# Patient Record
Sex: FEMALE | Race: WHITE | NOT HISPANIC OR LATINO | Employment: FULL TIME | URBAN - METROPOLITAN AREA
[De-identification: names, ages, dates, MRNs, and addresses within clinical notes are randomized per-mention and may not be internally consistent; named-entity substitution may affect disease eponyms.]

---

## 2017-01-13 ENCOUNTER — COMMUNICATION - HEALTHEAST (OUTPATIENT)
Dept: FAMILY MEDICINE | Facility: CLINIC | Age: 32
End: 2017-01-13

## 2017-03-09 ENCOUNTER — OFFICE VISIT - HEALTHEAST (OUTPATIENT)
Dept: FAMILY MEDICINE | Facility: CLINIC | Age: 32
End: 2017-03-09

## 2017-03-09 DIAGNOSIS — Z72.0 TOBACCO ABUSE: ICD-10-CM

## 2017-03-09 DIAGNOSIS — Z00.00 ROUTINE GENERAL MEDICAL EXAMINATION AT A HEALTH CARE FACILITY: ICD-10-CM

## 2017-03-09 DIAGNOSIS — Z11.3 SCREEN FOR STD (SEXUALLY TRANSMITTED DISEASE): ICD-10-CM

## 2017-03-09 DIAGNOSIS — R53.83 FATIGUE: ICD-10-CM

## 2017-03-09 LAB — HIV 1+2 AB+HIV1 P24 AG SERPL QL IA: NEGATIVE

## 2017-03-09 ASSESSMENT — MIFFLIN-ST. JEOR: SCORE: 1561.11

## 2017-03-10 LAB
HCV AB SERPL QL IA: NEGATIVE
SYPHILIS RPR SCREEN - HISTORICAL: NORMAL

## 2017-03-14 LAB
HPV INTERPRETATION - HISTORICAL: NORMAL
HPV INTERPRETER - HISTORICAL: NORMAL

## 2017-03-15 LAB
BKR LAB AP ABNORMAL BLEEDING: NO
BKR LAB AP BIRTH CONTROL/HORMONES: NORMAL
BKR LAB AP CERVICAL APPEARANCE: NORMAL
BKR LAB AP GYN ADEQUACY: NORMAL
BKR LAB AP GYN INTERPRETATION: NORMAL
BKR LAB AP HPV REFLEX: NORMAL
BKR LAB AP LMP: NORMAL
BKR LAB AP PATIENT STATUS: NO
BKR LAB AP PREVIOUS ABNORMAL: NO
BKR LAB AP PREVIOUS NORMAL: 2015
HIGH RISK?: NO
PATH REPORT.COMMENTS IMP SPEC: NORMAL
RESULT FLAG (HE HISTORICAL CONVERSION): NORMAL

## 2017-03-19 ENCOUNTER — COMMUNICATION - HEALTHEAST (OUTPATIENT)
Dept: FAMILY MEDICINE | Facility: CLINIC | Age: 32
End: 2017-03-19

## 2017-05-02 ENCOUNTER — COMMUNICATION - HEALTHEAST (OUTPATIENT)
Dept: FAMILY MEDICINE | Facility: CLINIC | Age: 32
End: 2017-05-02

## 2017-06-03 ENCOUNTER — HEALTH MAINTENANCE LETTER (OUTPATIENT)
Age: 32
End: 2017-06-03

## 2017-08-28 ENCOUNTER — OFFICE VISIT - HEALTHEAST (OUTPATIENT)
Dept: FAMILY MEDICINE | Facility: CLINIC | Age: 32
End: 2017-08-28

## 2017-08-28 DIAGNOSIS — J02.9 SORE THROAT: ICD-10-CM

## 2017-08-28 ASSESSMENT — MIFFLIN-ST. JEOR: SCORE: 1556.57

## 2017-08-30 ENCOUNTER — COMMUNICATION - HEALTHEAST (OUTPATIENT)
Dept: FAMILY MEDICINE | Facility: CLINIC | Age: 32
End: 2017-08-30

## 2017-08-30 DIAGNOSIS — J06.9 URI (UPPER RESPIRATORY INFECTION): ICD-10-CM

## 2018-02-06 ENCOUNTER — OFFICE VISIT - HEALTHEAST (OUTPATIENT)
Dept: FAMILY MEDICINE | Facility: CLINIC | Age: 33
End: 2018-02-06

## 2018-02-06 DIAGNOSIS — R05.9 COUGH: ICD-10-CM

## 2018-02-06 DIAGNOSIS — F41.9 ANXIETY: ICD-10-CM

## 2018-02-06 LAB
FLUAV AG SPEC QL IA: NORMAL
FLUBV AG SPEC QL IA: NORMAL

## 2018-02-06 ASSESSMENT — MIFFLIN-ST. JEOR: SCORE: 1507.53

## 2018-05-14 ENCOUNTER — COMMUNICATION - HEALTHEAST (OUTPATIENT)
Dept: FAMILY MEDICINE | Facility: CLINIC | Age: 33
End: 2018-05-14

## 2018-05-14 DIAGNOSIS — T63.441A BEE STING REACTION: ICD-10-CM

## 2018-09-17 ENCOUNTER — COMMUNICATION - HEALTHEAST (OUTPATIENT)
Dept: FAMILY MEDICINE | Facility: CLINIC | Age: 33
End: 2018-09-17

## 2018-09-18 ENCOUNTER — OFFICE VISIT - HEALTHEAST (OUTPATIENT)
Dept: FAMILY MEDICINE | Facility: CLINIC | Age: 33
End: 2018-09-18

## 2018-09-18 DIAGNOSIS — N91.2 AMENORRHEA: ICD-10-CM

## 2018-09-18 LAB
HCG UR QL: POSITIVE
SP GR UR STRIP: 1.02 (ref 1–1.03)

## 2018-09-18 ASSESSMENT — MIFFLIN-ST. JEOR: SCORE: 1552.03

## 2018-09-25 ENCOUNTER — COMMUNICATION - HEALTHEAST (OUTPATIENT)
Dept: FAMILY MEDICINE | Facility: CLINIC | Age: 33
End: 2018-09-25

## 2018-09-27 ENCOUNTER — COMMUNICATION - HEALTHEAST (OUTPATIENT)
Dept: FAMILY MEDICINE | Facility: CLINIC | Age: 33
End: 2018-09-27

## 2018-10-05 ENCOUNTER — COMMUNICATION - HEALTHEAST (OUTPATIENT)
Dept: FAMILY MEDICINE | Facility: CLINIC | Age: 33
End: 2018-10-05

## 2018-10-15 ENCOUNTER — PRENATAL OFFICE VISIT - HEALTHEAST (OUTPATIENT)
Dept: FAMILY MEDICINE | Facility: CLINIC | Age: 33
End: 2018-10-15

## 2018-10-15 DIAGNOSIS — F17.200 SMOKER: ICD-10-CM

## 2018-10-15 DIAGNOSIS — Z34.00 PREGNANCY, FIRST: ICD-10-CM

## 2018-10-15 LAB
ALBUMIN UR-MCNC: NEGATIVE MG/DL
AMPHETAMINES UR QL SCN: NORMAL
APPEARANCE UR: CLEAR
BARBITURATES UR QL: NORMAL
BASOPHILS # BLD AUTO: 0.1 THOU/UL (ref 0–0.2)
BASOPHILS NFR BLD AUTO: 1 % (ref 0–2)
BENZODIAZ UR QL: NORMAL
BILIRUB UR QL STRIP: NEGATIVE
CANNABINOIDS UR QL SCN: NORMAL
COCAINE UR QL: NORMAL
COLOR UR AUTO: YELLOW
CREAT UR-MCNC: 103 MG/DL
EOSINOPHIL # BLD AUTO: 0.1 THOU/UL (ref 0–0.4)
EOSINOPHIL NFR BLD AUTO: 1 % (ref 0–6)
ERYTHROCYTE [DISTWIDTH] IN BLOOD BY AUTOMATED COUNT: 12.7 % (ref 11–14.5)
GLUCOSE UR STRIP-MCNC: NEGATIVE MG/DL
HCT VFR BLD AUTO: 38.5 % (ref 35–47)
HGB BLD-MCNC: 12.8 G/DL (ref 12–16)
HGB UR QL STRIP: NEGATIVE
HIV 1+2 AB+HIV1 P24 AG SERPL QL IA: NEGATIVE
KETONES UR STRIP-MCNC: NEGATIVE MG/DL
LEUKOCYTE ESTERASE UR QL STRIP: ABNORMAL
LYMPHOCYTES # BLD AUTO: 1.6 THOU/UL (ref 0.8–4.4)
LYMPHOCYTES NFR BLD AUTO: 18 % (ref 20–40)
MCH RBC QN AUTO: 33 PG (ref 27–34)
MCHC RBC AUTO-ENTMCNC: 33.2 G/DL (ref 32–36)
MCV RBC AUTO: 99 FL (ref 80–100)
MONOCYTES # BLD AUTO: 0.7 THOU/UL (ref 0–0.9)
MONOCYTES NFR BLD AUTO: 8 % (ref 2–10)
NEUTROPHILS # BLD AUTO: 6.2 THOU/UL (ref 2–7.7)
NEUTROPHILS NFR BLD AUTO: 72 % (ref 50–70)
NITRATE UR QL: NEGATIVE
OPIATES UR QL SCN: NORMAL
OXYCODONE UR QL: NORMAL
PCP UR QL SCN: NORMAL
PH UR STRIP: 7.5 [PH] (ref 5–8)
PLATELET # BLD AUTO: 254 THOU/UL (ref 140–440)
PMV BLD AUTO: 10.6 FL (ref 8.5–12.5)
RBC # BLD AUTO: 3.88 MILL/UL (ref 3.8–5.4)
SP GR UR STRIP: 1.02 (ref 1–1.03)
UROBILINOGEN UR STRIP-ACNC: ABNORMAL
WBC: 8.6 THOU/UL (ref 4–11)

## 2018-10-16 LAB
ABO/RH(D): NORMAL
ABORH REPEAT: NORMAL
ANTIBODY SCREEN: NEGATIVE
BACTERIA SPEC CULT: NO GROWTH
C TRACH DNA SPEC QL PROBE+SIG AMP: NEGATIVE
HBV SURFACE AG SERPL QL IA: NEGATIVE
N GONORRHOEA DNA SPEC QL NAA+PROBE: NEGATIVE
RUBV IGG SERPL QL IA: POSITIVE
T PALLIDUM AB SER QL: NEGATIVE

## 2018-10-24 ENCOUNTER — RECORDS - HEALTHEAST (OUTPATIENT)
Dept: ADMINISTRATIVE | Facility: OTHER | Age: 33
End: 2018-10-24

## 2018-11-12 ENCOUNTER — COMMUNICATION - HEALTHEAST (OUTPATIENT)
Dept: FAMILY MEDICINE | Facility: CLINIC | Age: 33
End: 2018-11-12

## 2018-11-16 ENCOUNTER — COMMUNICATION - HEALTHEAST (OUTPATIENT)
Dept: FAMILY MEDICINE | Facility: CLINIC | Age: 33
End: 2018-11-16

## 2018-11-26 ENCOUNTER — PRENATAL OFFICE VISIT - HEALTHEAST (OUTPATIENT)
Dept: FAMILY MEDICINE | Facility: CLINIC | Age: 33
End: 2018-11-26

## 2018-11-26 DIAGNOSIS — Z34.02 PRIMIGRAVIDA IN SECOND TRIMESTER: ICD-10-CM

## 2018-12-08 ENCOUNTER — COMMUNICATION - HEALTHEAST (OUTPATIENT)
Dept: FAMILY MEDICINE | Facility: CLINIC | Age: 33
End: 2018-12-08

## 2018-12-17 ENCOUNTER — RECORDS - HEALTHEAST (OUTPATIENT)
Dept: ADMINISTRATIVE | Facility: OTHER | Age: 33
End: 2018-12-17

## 2018-12-17 ENCOUNTER — COMMUNICATION - HEALTHEAST (OUTPATIENT)
Dept: FAMILY MEDICINE | Facility: CLINIC | Age: 33
End: 2018-12-17

## 2019-01-28 ENCOUNTER — PRENATAL OFFICE VISIT - HEALTHEAST (OUTPATIENT)
Dept: FAMILY MEDICINE | Facility: CLINIC | Age: 34
End: 2019-01-28

## 2019-01-28 ENCOUNTER — COMMUNICATION - HEALTHEAST (OUTPATIENT)
Dept: FAMILY MEDICINE | Facility: CLINIC | Age: 34
End: 2019-01-28

## 2019-01-28 DIAGNOSIS — Z33.1 PREGNANCY, INCIDENTAL: ICD-10-CM

## 2019-01-28 LAB
ERYTHROCYTE [DISTWIDTH] IN BLOOD BY AUTOMATED COUNT: 10.1 % (ref 11–14.5)
FASTING STATUS PATIENT QL REPORTED: ABNORMAL
GLUCOSE 1H P 50 G GLC PO SERPL-MCNC: 144 MG/DL (ref 70–139)
HCT VFR BLD AUTO: 34.6 % (ref 35–47)
HGB BLD-MCNC: 11.7 G/DL (ref 12–16)
MCH RBC QN AUTO: 33.4 PG (ref 27–34)
MCHC RBC AUTO-ENTMCNC: 33.7 G/DL (ref 32–36)
MCV RBC AUTO: 99 FL (ref 80–100)
PLATELET # BLD AUTO: 263 THOU/UL (ref 140–440)
PMV BLD AUTO: 7.4 FL (ref 7–10)
RBC # BLD AUTO: 3.49 MILL/UL (ref 3.8–5.4)
WBC: 9.6 THOU/UL (ref 4–11)

## 2019-01-31 ENCOUNTER — COMMUNICATION - HEALTHEAST (OUTPATIENT)
Dept: FAMILY MEDICINE | Facility: CLINIC | Age: 34
End: 2019-01-31

## 2019-02-14 ENCOUNTER — PRENATAL OFFICE VISIT - HEALTHEAST (OUTPATIENT)
Dept: FAMILY MEDICINE | Facility: CLINIC | Age: 34
End: 2019-02-14

## 2019-02-14 DIAGNOSIS — Z33.1 PREGNANCY, INCIDENTAL: ICD-10-CM

## 2019-02-14 LAB
ERYTHROCYTE [DISTWIDTH] IN BLOOD BY AUTOMATED COUNT: 10.6 % (ref 11–14.5)
FASTING STATUS PATIENT QL REPORTED: YES
GLUCOSE 1H P 100 G GLC PO SERPL-MCNC: 142 MG/DL
GLUCOSE 2H P 100 G GLC PO SERPL-MCNC: 125 MG/DL
GLUCOSE 3H P 100 G GLC PO SERPL-MCNC: 65 MG/DL
GLUCOSE P FAST SERPL-MCNC: 91 MG/DL
HCT VFR BLD AUTO: 36 % (ref 35–47)
HGB BLD-MCNC: 12.2 G/DL (ref 12–16)
MCH RBC QN AUTO: 33.5 PG (ref 27–34)
MCHC RBC AUTO-ENTMCNC: 33.8 G/DL (ref 32–36)
MCV RBC AUTO: 99 FL (ref 80–100)
PLATELET # BLD AUTO: 239 THOU/UL (ref 140–440)
PMV BLD AUTO: 8.7 FL (ref 7–10)
RBC # BLD AUTO: 3.64 MILL/UL (ref 3.8–5.4)
WBC: 8.2 THOU/UL (ref 4–11)

## 2019-03-18 ENCOUNTER — COMMUNICATION - HEALTHEAST (OUTPATIENT)
Dept: FAMILY MEDICINE | Facility: CLINIC | Age: 34
End: 2019-03-18

## 2019-03-20 ENCOUNTER — PRENATAL OFFICE VISIT - HEALTHEAST (OUTPATIENT)
Dept: FAMILY MEDICINE | Facility: CLINIC | Age: 34
End: 2019-03-20

## 2019-03-20 DIAGNOSIS — Z3A.33 33 WEEKS GESTATION OF PREGNANCY: ICD-10-CM

## 2019-03-20 DIAGNOSIS — Z34.03 SUPERVISION OF NORMAL FIRST PREGNANCY IN THIRD TRIMESTER: ICD-10-CM

## 2019-04-03 ENCOUNTER — PRENATAL OFFICE VISIT - HEALTHEAST (OUTPATIENT)
Dept: FAMILY MEDICINE | Facility: CLINIC | Age: 34
End: 2019-04-03

## 2019-04-03 DIAGNOSIS — Z34.03 ENCOUNTER FOR SUPERVISION OF NORMAL FIRST PREGNANCY IN THIRD TRIMESTER: ICD-10-CM

## 2019-04-03 DIAGNOSIS — Z3A.35 35 WEEKS GESTATION OF PREGNANCY: ICD-10-CM

## 2019-04-03 LAB
ALBUMIN UR-MCNC: NEGATIVE MG/DL
GLUCOSE UR STRIP-MCNC: NEGATIVE MG/DL
KETONES UR STRIP-MCNC: NEGATIVE MG/DL

## 2019-04-04 ENCOUNTER — COMMUNICATION - HEALTHEAST (OUTPATIENT)
Dept: FAMILY MEDICINE | Facility: CLINIC | Age: 34
End: 2019-04-04

## 2019-04-04 LAB
ALLERGIC TO PENICILLIN: NO
GP B STREP DNA SPEC QL NAA+PROBE: NEGATIVE

## 2019-04-11 ENCOUNTER — PRENATAL OFFICE VISIT - HEALTHEAST (OUTPATIENT)
Dept: FAMILY MEDICINE | Facility: CLINIC | Age: 34
End: 2019-04-11

## 2019-04-11 DIAGNOSIS — Z34.90 PREGNANT: ICD-10-CM

## 2019-04-11 DIAGNOSIS — Z34.03 PRIMIGRAVIDA IN THIRD TRIMESTER: ICD-10-CM

## 2019-04-19 ENCOUNTER — PRENATAL OFFICE VISIT - HEALTHEAST (OUTPATIENT)
Dept: FAMILY MEDICINE | Facility: CLINIC | Age: 34
End: 2019-04-19

## 2019-04-19 DIAGNOSIS — Z34.93 SUPERVISION OF NORMAL PREGNANCY IN THIRD TRIMESTER: ICD-10-CM

## 2019-04-24 ENCOUNTER — COMMUNICATION - HEALTHEAST (OUTPATIENT)
Dept: FAMILY MEDICINE | Facility: CLINIC | Age: 34
End: 2019-04-24

## 2019-04-24 ENCOUNTER — ANESTHESIA - HEALTHEAST (OUTPATIENT)
Dept: OBGYN | Facility: CLINIC | Age: 34
End: 2019-04-24

## 2019-04-26 ENCOUNTER — HOME CARE/HOSPICE - HEALTHEAST (OUTPATIENT)
Dept: HOME HEALTH SERVICES | Facility: HOME HEALTH | Age: 34
End: 2019-04-26

## 2019-05-06 ENCOUNTER — AMBULATORY - HEALTHEAST (OUTPATIENT)
Dept: PEDIATRICS | Facility: CLINIC | Age: 34
End: 2019-05-06

## 2019-05-20 ENCOUNTER — COMMUNICATION - HEALTHEAST (OUTPATIENT)
Dept: FAMILY MEDICINE | Facility: CLINIC | Age: 34
End: 2019-05-20

## 2019-06-06 ENCOUNTER — OFFICE VISIT - HEALTHEAST (OUTPATIENT)
Dept: FAMILY MEDICINE | Facility: CLINIC | Age: 34
End: 2019-06-06

## 2019-06-06 ASSESSMENT — MIFFLIN-ST. JEOR: SCORE: 1650.69

## 2019-06-07 ENCOUNTER — COMMUNICATION - HEALTHEAST (OUTPATIENT)
Dept: FAMILY MEDICINE | Facility: CLINIC | Age: 34
End: 2019-06-07

## 2019-06-07 DIAGNOSIS — T63.441A BEE STING REACTION: ICD-10-CM

## 2019-06-09 ENCOUNTER — COMMUNICATION - HEALTHEAST (OUTPATIENT)
Dept: FAMILY MEDICINE | Facility: CLINIC | Age: 34
End: 2019-06-09

## 2019-06-09 DIAGNOSIS — T63.441A BEE STING REACTION: ICD-10-CM

## 2019-06-13 ENCOUNTER — COMMUNICATION - HEALTHEAST (OUTPATIENT)
Dept: FAMILY MEDICINE | Facility: CLINIC | Age: 34
End: 2019-06-13

## 2019-07-06 ENCOUNTER — COMMUNICATION - HEALTHEAST (OUTPATIENT)
Dept: FAMILY MEDICINE | Facility: CLINIC | Age: 34
End: 2019-07-06

## 2019-11-18 ENCOUNTER — COMMUNICATION - HEALTHEAST (OUTPATIENT)
Dept: FAMILY MEDICINE | Facility: CLINIC | Age: 34
End: 2019-11-18

## 2019-12-18 ENCOUNTER — COMMUNICATION - HEALTHEAST (OUTPATIENT)
Dept: FAMILY MEDICINE | Facility: CLINIC | Age: 34
End: 2019-12-18

## 2019-12-28 ENCOUNTER — COMMUNICATION - HEALTHEAST (OUTPATIENT)
Dept: FAMILY MEDICINE | Facility: CLINIC | Age: 34
End: 2019-12-28

## 2019-12-30 ENCOUNTER — OFFICE VISIT - HEALTHEAST (OUTPATIENT)
Dept: FAMILY MEDICINE | Facility: CLINIC | Age: 34
End: 2019-12-30

## 2019-12-30 DIAGNOSIS — R11.0 NAUSEA: ICD-10-CM

## 2019-12-30 DIAGNOSIS — L01.00 IMPETIGO: ICD-10-CM

## 2020-02-10 ENCOUNTER — COMMUNICATION - HEALTHEAST (OUTPATIENT)
Dept: FAMILY MEDICINE | Facility: CLINIC | Age: 35
End: 2020-02-10

## 2020-04-16 ENCOUNTER — COMMUNICATION - HEALTHEAST (OUTPATIENT)
Dept: FAMILY MEDICINE | Facility: CLINIC | Age: 35
End: 2020-04-16

## 2020-04-16 DIAGNOSIS — Z30.09 GENERAL COUNSELING FOR PRESCRIPTION OF ORAL CONTRACEPTIVES: ICD-10-CM

## 2020-06-06 ENCOUNTER — COMMUNICATION - HEALTHEAST (OUTPATIENT)
Dept: FAMILY MEDICINE | Facility: CLINIC | Age: 35
End: 2020-06-06

## 2020-07-26 ENCOUNTER — COMMUNICATION - HEALTHEAST (OUTPATIENT)
Dept: FAMILY MEDICINE | Facility: CLINIC | Age: 35
End: 2020-07-26

## 2020-07-26 DIAGNOSIS — L01.00 IMPETIGO: ICD-10-CM

## 2020-08-31 ENCOUNTER — AMBULATORY - HEALTHEAST (OUTPATIENT)
Dept: NURSING | Facility: CLINIC | Age: 35
End: 2020-08-31

## 2020-08-31 DIAGNOSIS — Z23 NEEDS FLU SHOT: ICD-10-CM

## 2020-11-04 ENCOUNTER — COMMUNICATION - HEALTHEAST (OUTPATIENT)
Dept: FAMILY MEDICINE | Facility: CLINIC | Age: 35
End: 2020-11-04

## 2020-12-17 ENCOUNTER — COMMUNICATION - HEALTHEAST (OUTPATIENT)
Dept: FAMILY MEDICINE | Facility: CLINIC | Age: 35
End: 2020-12-17

## 2021-02-03 ENCOUNTER — COMMUNICATION - HEALTHEAST (OUTPATIENT)
Dept: FAMILY MEDICINE | Facility: CLINIC | Age: 36
End: 2021-02-03

## 2021-05-26 ENCOUNTER — RECORDS - HEALTHEAST (OUTPATIENT)
Dept: ADMINISTRATIVE | Facility: CLINIC | Age: 36
End: 2021-05-26

## 2021-05-26 VITALS — SYSTOLIC BLOOD PRESSURE: 116 MMHG | DIASTOLIC BLOOD PRESSURE: 74 MMHG | HEART RATE: 88 BPM | TEMPERATURE: 97.9 F

## 2021-05-27 NOTE — PATIENT INSTRUCTIONS - HE
Patient Education   HEALTHY PREGNANCY CARE: 34-36 WEEKS PREGNANT    Your baby is gaining about an ounce each day, so healthy nutrition and rest are still very important. Learn about late pregnancy symptoms, such as constipation and backaches. Drinking more fluids and eating more fiber can relieve constipation. The pelvic tilt and a heating pad can ease backaches.    Continue to watch for signs and symptoms of preeclampsia:     Sudden swelling of your face, hands, or feet     New vision problems such as blurring, double vision, or flashing lights    A severe headache not relieved with acetaminophen (Tylenol)    Sharp or stabbing pain in your right or middle upper abdomen    You're almost done with your pregnancy but it's still too soon to have your baby. The goal is to have your baby after 37 weeks, so watch for signs and symptoms of premature labor:     Regular contractions. This means having about 6 or more within 1 hour, even after you have had a glass of water and are resting.     A backache that starts and stops regularly.    An increase or change in vaginal discharge, such as heavy, mucus-like, watery, or bloody discharge.     Your water breaks or leaks.    You will be tested for group B streptococcus (GBS), a type of bacteria found in 10-30% of pregnant women. A woman with GBS can pass it to her baby during delivery. Most babies who get GBS from their mothers do not have any problems, but some babies get very ill and need to be hospitalized for antibiotic treatment. Treating you with antibiotics during labor and delivery helps to prevent infection in your baby.    Review information on postpartum care that you will need after the baby is born.  Discuss your choices and plans for birth control with your midwife or physician.     Postpartum Care  During the days and weeks after the delivery of your baby (postpartum period), your body will change as it returns to its nonpregnant condition. As with pregnancy  "changes, postpartum changes are different for every woman.    Physical changes after childbirth  The changes in your body may include:    Contractions called afterpains shrink the uterus for several days after childbirth. Shrinking of the uterus to its prepregnancy size may take 6 to 8 weeks.    Sore muscles (especially in the arms, neck, or jaw) are common after childbirth. This is because of the hard work of labor and pushing your baby out. The soreness should go away in a few days.    Bleeding and vaginal discharge (lochia) may last for 2 to 8 weeks, and can come and go for about 2 months.    Vaginal soreness, including pain, discomfort, and numbness, is common after vaginal birth. Soreness may be worse if you had a perineal tear or episiotomy.    If you had a  birth (), you may have pain in your lower abdomen and may need pain medicine for 1 to 2 weeks.    Breast engorgement is common around the 3rd or 4th day after delivery, when the breasts begin to fill with milk. This can cause discomfort and swelling. If you are breast feeding, the best treatment is to feed your baby often or pump the milk out. You can also use warm compresses. If you are not breast feeding, placing ice packs on your breasts, taking a hot shower, or using warm compresses may relieve the discomfort.    Be aware of postpartum depression    \"Baby blues\" are common for the first 1 to 2 weeks after birth. You may cry or feel sad or irritable for no reason.     For some women, these feelings last longer and are more intense. This is called postpartum depression.     If your symptoms last for more than a few weeks or you feel very depressed, ask your midwife or physician for help.     Postpartum depression can be treated. Support groups and counseling can help, but sometimes medication is needed.     Discuss follow-up appointments with your midwife or physician, as well. He or she will usually want to see you 6 weeks after the " birth of your baby, sooner if you are having problems.    If you have questions about any symptoms you are experiencing or any other concerns, call your provider or their clinic staff at [facility] at Phone: [phone]. If it's after clinic hours, physician patients should call the [facility] at [phone] ([phone]); midwife patients should call their answering service at [phone].    How can you care for yourself at home?   You can refer to the Starting Out Right book or find it online at [email or web address]-[email or web address]-Right.pdf or [email or web address]/images/[email or web address]/flipbooks/[facility]-starting-out-right/[facility]-starting-out-right.html#p=8     You can sign up for a weekly parenting e-mail that gives support, tips and advice from health care professionals that starts with pregnancy and continues through the toddler years. To register, go to [email or web address] at any time during your pregnancy.        Making Plans for Feeding My Baby    By this point, you probably have read a lot about feeding your baby.  Breastfeed or formula? Each mother s decision is her own and [facility] respects you and your choices. We ve gathered information on both breastfeeding and formula feeding to help with your decision. Talking with your physician or nurse-midwife can also help in your decision.  However you plan to feed your baby, [facility] encourage rooming in with your baby, skin-to-skin contact and feeding your baby based on his or her cues.    Skin-to-skin contact  Being close to mom helps your baby adjust to life outside of the womb.  It helps your baby regulate their body temperature, heart rate, and breathing.  Your baby will usually be placed skin-to-skin immediately following birth or as soon as possible, if medical intervention is needed.    Rooming-In  Having your  baby stay with you in your room is called  rooming-in .  Keeping your baby in your room helps you to learn how to care for your baby by getting to know your baby s cues, body rhythms and sleep cycle.       Cue-based feeding  Cues (signals) are baby s way of telling you what he or she wants.  When you learn your infant s cues, you know how to care for and feed your baby.   Feeding cues are the licking and smacking of lips, bringing their fist to their mouth, and a reflex called  rooting - where baby turns and opens his or her mouth, searching for the breast or bottle.  Crying is a late feeding cue.  Babies can feed frequently, often at least 8 times in 24 hours.  Breastfeeding facts  Breast milk is the best source of nutrition for your baby and is available at birth. In the first couple of days, your milk volume is already starting to increase, though it may not be noticeable. Breastfeed frequently to increase your milk supply. Within three to five days, you will begin to notice larger milk volumes. An increase in breast size, heaviness and firmness are often described as the milk  coming in.  Frequent breastfeeding can help breasts from getting overly firm and painful. You will know the baby is getting enough milk if your baby is having wet and dirty diapers and gaining weight.     If your goal is to exclusively breastfeed, it is important to not use any formula or artificial nipples (including bottles and pacifiers) while your baby is learning to breastfeed.  While it may seem like an  easy  option to give your baby a bottle, formula should only be given if there is a medical reason for your baby to have it.    Positioning and attachment   Get comfortable.  Use pillows as needed to support your arms and baby.  Hold baby close at the level of your breast, facing you in a tummy to tummy position.  Skin to skin helps with this.  Position the baby with his or her nose by the nipple.  There should be a straight line  from baby s ear to shoulder to hips.  Tickle your baby s lips or wait for baby to open mouth wide, bring baby to breast by leading with the chin.  Aim the nipple at the roof of baby s mouth.  A rapid sucking pattern is followed by longer, drawing pattern with occasional swallows heard.  When baby is correctly latched, your nipple and much of the areola are pulled well into baby s mouth.      Returning to work or school  Focus on a good start to breastfeeding.  Many women continue to provide breastmilk for their baby when they return to work or school.  Making plans about where to pump and store milk can make the transition go well.  Talk with other mothers who have also returned to work or school for tips and support.  Your employer s Human Resource department may be a resource as well.     Returning to work or school: (continued)     babies can mean fewer  sick  days for you.    A quality breast pump will also save time and add comfort.  Check with your insurance prior to giving birth for breast pump coverage.  Many insurance companies include a pump within your benefits.    Wait until your baby is at least three weeks old to introduce a bottle for the first time.  Have someone besides you give the bottle.    Breastfeed when you are with your baby. Reserve your bottles of breast milk for when you are away.     Your breasts will need to be  emptied  either by your baby or a pump.  Plan to pump at least twice in an eight hour day.    If you cannot pump at work, continue breastfeeding at home. Any amount of breast milk is worth giving to your baby.    Formula feeding facts  If you are planning to use formula to feed your baby, you will want to make some preparations ahead of time. Talk to your doctor or nurse-midwife about what type of formula to use. Some are iron-fortified, meaning they have extra iron in them. You will want to purchase formula and bottles before your baby is born to be sure you are ready  after you return from the hospital. The [facility] do not provide formula samples to take home.    Be sure to follow formula mixing directions closely. Regular milk in the dairy case at the grocery store should not be given to babies under 1 year old. Baby formula is sold in several forms including:    Ready-to-use. This is the most expensive, but no mixing is necessary.    Concentrated liquid. This is less expensive than ready-to-use and you mix with water.    Powder. This is the least expensive. You mix one level scoop of powdered formula with two ounces of water and stir well.    Most babies need 2.5 ounces of formula per pound of body weight each day. This means an 8-pound baby may drink about 20 ounces of formula a day; however, this is just an estimate. The most important thing is to pay attention to your baby s cues.  If your baby is always fussy, needs more iron or has certain food allergies, your physician may suggest you change your baby s formula to a different kind.   How do I warm my baby s bottles?  You may feed your baby a bottle without warming it first. It is OK for the breast milk or formula to be cool or room temperature. If your baby seems to prefer it warmed, you can put the filled bottle in a container of warm water and let it stand for a few minutes. Check the temperature of the liquid on your skin before feeding it to your baby; to be sure it isn t too hot. Do not heat bottles in the microwave. Microwaves heat food and liquids unevenly, and this can cause hot spots that can burn your baby.    How do I clean and sterilize bottles?  Sterilize bottles and nipples before you use them for the first time. You can do this by putting them in boiling water for 5 minutes. After that first time, you can wash them in hot and soapy water. Rinse them carefully to be sure there is no soap left on them. You can also wash them in the .    [facility] Connection [phone] ([phone])

## 2021-05-27 NOTE — PROGRESS NOTES
"Patient states she is doing fairly well, hanging in there.  She is sleeping fairly well.  I have not seen her now since February.  She has seen 1 of my partners the last 2 appointments as she stated \"she can get in to see me\".  Discussed that I would always squeeze her in if she cannot get an appointment.  Recommend that she make some of her appointments at a time.  We reviewed her delivery preferences today.  She states the father the baby may or may not be there.  She otherwise plans to possibly just labor on her own.  She is now thinking that she probably wants to get an epidural at some point.  She had a difficult cervical exam today as she was having discomfort and it was very posterior.  The head is actually come down anteriorly and pushed cervix way back.  She is having good fetal movement.  Her GBS was negative and she did her tetanus booster.  She does plan to breast-feed and can get a breast pump at the hospital or from our clinic after delivery.  We will plan to see her weekly at this point.  I did discuss with her that the sooner she could be induced would be 41 weeks if there is no other complications.  "

## 2021-05-27 NOTE — PROGRESS NOTES
[facility] Clinic Note    Kaley Mills  1985   [id number]    Kaley Mills is a 34 y.o.  at 35w5d by LMP presenting to discuss the following:       CC:   Chief Complaint   Patient presents with     Routine Prenatal Visit       HPI:  Kaley is doing well today. No particular concerns today. She is feeling baby move regularly. Trace lower leg swelling. Starting to have some [place] contractions.    ROS:   No headaches, no vision changes, no chest pain, no shortness of breath, no RUQ abdominal pain, no vaginal discharge/bleeding.      PMH:   Patient Active Problem List   Diagnosis     Bee sting reaction     Primigravida in third trimester       No past medical history on file.    PSH:   No past surgical history on file.      MEDICATIONS:   Current Outpatient Medications on File Prior to Visit   Medication Sig Dispense Refill     EPINEPHrine (EPIPEN/ADRENACLICK) 0.3 mg/0.3 mL injection Inject 0.3 mL (0.3 mg total) into the shoulder, thigh, or buttocks once as needed. 2 Pre-filled Pen Syringe 2     prenatal vit 91/iron/folic/dha (PRENATAL + DHA ORAL) Take by mouth daily.       No current facility-administered medications on file prior to visit.        ALLERGIES:  Allergies   Allergen Reactions     Bee Venom Protein (Honey Bee)        PHYSICAL EXAM:   /70   Wt 219 lb 4 oz (99.5 kg)   LMP 2018   BMI 33.83 kg/m     GENERAL: Kaley is a pleasant, well appearing female.   ABDOMEN: FH 36cm,  pbm, vertex presentation by bedside US.   EXTREMITIES: Trace lower extremity edema.     ASSESSMENT & PLAN:   Kaley Mills is a 34 y.o.  at 35w5d presenting for routine OB care.    1. Encounter for supervision of normal first pregnancy in third trimester  2. 35 weeks gestation of pregnancy  - Group B Strep Screen by PCR  - Urinalysis, OB Screen (ketones, glucose, protein only)     Pregnancy is going well. GBS collected today.     RTC: 1 week - JUHI Maciel DO

## 2021-05-28 NOTE — PROGRESS NOTES
Patient is feeling fairly well, she is sleeping well.  She is not having any contractions, bleeding, or leaking.  She declines doing the pelvic exam today.  She is still vertex on exam.  Good fetal heart tones.  Follow-up in 1 week.

## 2021-05-28 NOTE — ANESTHESIA PROCEDURE NOTES
Epidural Block    Patient location during procedure: OB  Time Called: 4/24/2019 1:39 PM  Reason for Block:labor epidural  Staffing:  Performing  Anesthesiologist: Pietro Oliveira IV, MD  Preanesthetic Checklist  Completed: patient identified, risks, benefits, and alternatives discussed, timeout performed, consent obtained, at patient's request, airway assessed, oxygen available, suction available, emergency drugs available and hand hygiene performed  Procedure  Patient position: sitting  Prep: ChloraPrep  Patient monitoring: continuous pulse oximetry, heart rate and blood pressure  Approach: midline  Location: L3-L4  Injection technique: MÓNICA saline  Number of Attempts:1  Needle  Needle type: Eliezer   Needle gauge: 18 G     Catheter in Space: 4  Assessment  Sensory level:  No complications

## 2021-05-28 NOTE — ANESTHESIA POSTPROCEDURE EVALUATION
Patient: Kaley Mills  * No procedures listed *  Anesthesia type: epidural    Patient location: PACU  Last vitals: No vitals data found for the desired time range.    Post vital signs: stable  Level of consciousness: awake and responds to simple questions  Post-anesthesia pain: pain controlled  Post-anesthesia nausea and vomiting: no  Pulmonary: unassisted, return to baseline  Cardiovascular: stable and blood pressure at baseline  Hydration: adequate  Anesthetic events: no    QCDR Measures:  ASA# 11 - Whitney-op Cardiac Arrest: ASA11B - Patient did NOT experience unanticipated cardiac arrest  ASA# 12 - Whitney-op Mortality Rate: ASA12B - Patient did NOT die  ASA# 13 - PACU Re-Intubation Rate: ASA13B - Patient did NOT require a new airway mgmt  ASA# 10 - Composite Anes Safety: ASA10A - No serious adverse event    Additional Notes:

## 2021-05-28 NOTE — PROGRESS NOTES
"[facility] [place] [facility] Lactation Visit     Assessment:     1.  difficulty in feeding at breast         Gisell has been gaining weight well and is above her birth weight, but has been primarily bottle fed as mom has not been successful in getting her to latch onto the breast at home. Today we used a 20 mm nipple shield and baby was able to latch on well and transfer 1.3 oz from the breast in the office. Mom's milk supply is slightly lower than what Gisell needs at this time for good growth.      Plan:     Breastfeed on demand, at least 8-12 times a day. The more often you nurse or pump, the more milk you will make. Try to keep engaged at the breast for about 20 minutes per side. Offer both sides every time, and alternate which breast you start on. Latch baby deeply by making a \"breast sandwich,\" and aim your nipple for the roof of the mouth. Use the 20 mm nipple shield for now - when she is consistently latching well with this, we can try to get her back to nursing without the shield.      If baby's lips are rolled inward, flip the top lip out with your finger, and then apply gentle downward pressure to the chin to help the lips flange out like \"fish lips.\" If you have pain that lasts beyond the initial latch-on, always restart. When sucking/swallowing frequency starts to slow down, do breast compressions/massage and tickle baby's feet to keep them alert with feeding. A diaper change between sides can be helpful to keep her alert.     Supplementation plan: Follow her cues. If she still seems hungry after nursing then give her a supplement of expressed breast milk or formula. Start with about 1 oz, more if she demands.      Recommended to pump: At least 4 times per day after nursing for now to continue to increase your milk supply.     Continue to monitor output, expect at least 6 wet diapers per day.   Recommended Vitamin D 400 IU daily.         Provided mom with a tip sheet for exclusive pumping as well as " information about increasing breast milk supply.      Follow up in about 1 week for a weight check, for follow up lactation as needed.         SUBJECTIVE:      Gisell is here today with mom, Kaley, for lactation support. She is a 11 days female born at Gestational Age: 38w6d now 11 days.    She is having difficulties with feeding. She has gained 5.5 oz since last visit 4 days ago. She has gained approximately 1.4 oz per day over the past 4 days.     Baby attempts to nurse twice a day. She has not latched on successfully since she was in the hospital.    Baby bottle feeds about 8 times in 24 hours.   Baby is supplemented with expressed breast milk or formula, about 1 oz of breast milk and then 1-2 of formula.   Mom is also pumping about 8 times per day and gets about 2 oz per pumping session. She pumps for about 10 - 15 minutes per session  Number of wet diapers in 24 hours:   Number of stools in 24 hours: 2 +  Color and consistency of stools: yellow/brown and loose  Mom noticed her breasts grew larger and areolas darkened during pregnancy and she noticed primary engorgement when her milk came in on day 3-4.        Breastfeeding Goals: Hoping to latch baby to the breast      Previous Breastfeeding Experience: None  Breast-surgery: No  Maternal medications: tylenol, ibuprofen, PNV, fenugreek and blessed thistle packets     Hospital course:  Female-Kaley Mills is a currently 3 days old infant born at 38w6d gestation via Vaginal, Spontaneous delivery on 4/25/2019 at 12:06 AM in the setting of meconium, OP positioning with Caput hematoma formation.     Following delivery the infant remained with mother in the room until noted at 50 hours of life to have indirect hyperbilirubinemia s/t cephalohematoma formation and initially taking low volumes of breastmilk with 8.5% weight loss on Day 2 of life, improved to 6% weight loss on Day 3 of life, day of discharge.      Baby was transferred to NICU for phototherapy and received  ~30hrs of light therapy. Mom is O+, baby A+, MARIANA negative.     Bilirubin's as follows:  Serum BiliT 14.2 @ 49hr of life--> 11.2 @ 62hr of life--> 6.9 @ 78hr of life before discharge.                     Results for orders placed or performed during the hospital encounter of 19   [facility] Metabolic Screen   Result Value Ref Range     Scan Result See Scanned Report     Bilirubin,  Panel   Result Value Ref Range     Bilirubin, Total 14.2 (H) 0.0 - 7.0 mg/dL     Bilirubin, Direct 0.4 <=0.5 mg/dL     Bilirubin, Indirect 13.8 (H) 0.0 - 7.0 mg/dL     Age in Hours 49 hours   Bilirubin,  Panel   Result Value Ref Range     Bilirubin, Total 11.2 (H) 0.0 - 7.0 mg/dL     Bilirubin, Direct 0.4 <=0.5 mg/dL     Bilirubin, Indirect 10.8 (H) 0.0 - 7.0 mg/dL     Age in Hours 62 hours   ABO/Rh Typing ()   Result Value Ref Range     ABO/Rh Typing A POS       ABO/Rh Typing Repeat A POS     Direct Antiglobulin Test (patient = 4 months old)   Result Value Ref Range     MARIANA IgG NEG Negative   Bilirubin,  Total   Result Value Ref Range     Bilirubin, Total 6.9 0.0 - 7.0 mg/dL     Age in Hours 78 hours      No current outpatient medications on file.  No past medical history on file.  No past surgical history on file.        Family History   Problem Relation Age of Onset     Breast cancer Maternal Grandmother           diagnosed in 50's     Melanoma Maternal Grandfather 62            Primary care provider: Mera Keita MD     OBJECTIVE:     Mother:   Nipples are everted, the areola is compressible, the breast is soft and full.      Sore nipples: None   Maternal depression screening: Doing well  EPDS: 7     Infant:      Age today: 11 days         Vitals:     19 1500   Pulse: 162   Resp: 58   Temp: 98.5  F (36.9  C)            Weight:       Wt Readings from Last 3 Encounters:   19 7 lb 8.5 oz (3.416 kg) (37 %, Z= -0.33)*   19 7 lb 3 oz (3.26 kg) (34 %, Z= -0.40)*   19 6 lb 14.6 oz  "(3.134 kg) (34 %, Z= -0.42)*      * Growth percentiles are based on WHO (Girls, 0-2 years) data.         Birthweight:  7 lb 6 oz (3.345 kg).   Today's weight:    Vitals        Vitals:     05/06/19 1500 05/06/19 1514   Weight: 7 lb 7.5 oz (3.388 kg) 7 lb 8.5 oz (3.416 kg)      . This is 2% from birth weight.   Average weight gain over last 4 days: 1.4 oz/day.        Test weights:     LEFT side: 0.5 oz  RIGHT side: 0.8 oz     TOTAL transfer:  1.3 oz     Feeding assessment:      Digital suck assessment:  Infant draws consultant's finger into mouth, palate intact, tongue over gums, normal frenulum.      We initially tried to latch baby without a nipple shield. She quickly became frantic and was rooting but did not latch. After holding a \"breast sandwich\" baby did latch on and sucked for about 30 seconds. Mom said it was comfortable and felt similar to her breast pump. Gisell then un-latched and after several attempts did not latch back on. At that point a 20 mm nipple shield was applied to mom's nipple, and Gisell was quickly able to latch on and sustain a latch with audible swallows for about 10-15 minutes per side.      Baby can hold suction with tongue while at the breast.      Alignment: Baby's head was aligned with its trunk. Baby did face mother. Baby was in cross cradle position today.      Areolar Grasp: Baby was able to open mouth wide. Baby's lips were not pursed. Baby's lips did flange outward. Tongue was visible just barely over bottom lip. Baby had complete seal.      Areolar Compression: Baby made rhythmic motion. There were no clicking or smacking sounds. There was no severe nipple discomfort.  Nipples appeared round after feeding.     Audible swallowing: Baby made quiet sounds of swallowing: There was an increase in frequency after milk ejection reflex. The milk ejection reflex is appropriate and milk supply appears slightly low today.       The visit lasted a total of 60 minutes that I spent face to face with " the patient. Of that time, 60 minutes were spent on lactation. Over 50% of the time was spent counseling and educating the patient about feeding difficulties and lactation concerns.      Completed by:   SATYA Scott, IBCLC, [facility], Pediatrics.  5/6/2019 12:49 PM

## 2021-05-28 NOTE — ANESTHESIA PREPROCEDURE EVALUATION
Anesthesia Evaluation      Patient summary reviewed   No history of anesthetic complications     Airway   Mallampati: II  Neck ROM: full   Pulmonary - negative ROS and normal exam                          Cardiovascular - negative ROS and normal exam   Neuro/Psych - negative ROS     Endo/Other - negative ROS      GI/Hepatic/Renal - negative ROS           Dental                         Anesthesia Plan  Planned anesthetic: epidural    ASA 2     Anesthetic plan and risks discussed with: patient

## 2021-05-28 NOTE — TELEPHONE ENCOUNTER
Weeks 39    Contractions started in the middle of night    Contractions are now 2-3 minutes apart and getting stronger    Just had a gush of fluids    Baby number one    [facility] for delivery.    [facility] called.    Pt to go to L & D.    Sarah Rodriguez RN  [facility] Medication Refill and Triage Nurse  4/24/2019  7:58 AM      Reason for Disposition    First baby (primipara) with contractions < 6 minutes apart, and present 2 hours    Protocols used: PREGNANCY - LABOR-A-OH

## 2021-05-29 NOTE — PROGRESS NOTES
Assessment/ Plan     1. Postpartum exam  Patient is now 6 weeks postpartum from a vaginal delivery.  She is doing very verbally well.  She still having some pelvic pain and would like refills of the ibuprofen.  She declines birth control today.  She is up-to-date on Pap smear.  Will fax a return to work note to [employer].  - ibuprofen (ADVIL,MOTRIN) 800 MG tablet; Take 1 tablet (800 mg total) by mouth every 8 (eight) hours.  Dispense: 60 tablet; Refill: 0      Subjective:       Kaley Mills is a 34 y.o. female who presents for postpartum exam.  Patient is now 6 weeks postpartum from a normal spontaneous vaginal delivery.  She had a second-degree tear and states that that is feeling back to normal.  She is having normal bowel and bladder function.  She feels like she is bonding with the baby.  She is pumping and giving the baby a bottle with breast milk and formula.  She has low supply and has tried using fenugreek over-the-counter.  She is happy with how things are going now with the breast-feeding.  She plans on going back to work at 12 weeks.  Her mood is normal, no postpartum depression or anxiety.  The father of the baby has been somewhat involved.  She feels like she is getting good support from her mother.  She has stopped bleeding almost immediately and now she thinks she is actually had a period.  She does not want to do any birth control at this point.  She is up-to-date on Pap smear.  She still having some pelvic discomfort and would like a refill on the ibuprofen as its working well for her.    Relevant past medical, family, surgical, and social history reviewed with patient, unless noted in HPI, not pertinent for this visit.  Medications were discussed and reconciled.   Review of Systems   A 12 point comprehensive review of systems was negative except as noted.      Current Outpatient Medications   Medication Sig Dispense Refill     acetaminophen (TYLENOL) 500 MG tablet Take 2 tablets (1,000 mg total) by  "mouth every 6 (six) hours as needed.  0     EPINEPHrine (EPIPEN/ADRENACLICK) 0.3 mg/0.3 mL injection Inject 0.3 mL (0.3 mg total) into the shoulder, thigh, or buttocks once as needed. 2 Pre-filled Pen Syringe 2     ibuprofen (ADVIL,MOTRIN) 800 MG tablet Take 1 tablet (800 mg total) by mouth every 8 (eight) hours. 60 tablet 0     prenatal vit 91/iron/folic/dha (PRENATAL + DHA ORAL) Take by mouth daily.       No current facility-administered medications for this visit.        Objective:      /70   Pulse 62   Temp 98.2  F (36.8  C) (Oral)   Resp 16   Ht 5' 8\" (1.727 m)   Wt 200 lb (90.7 kg)   LMP 07/27/2018   BMI 30.41 kg/m        General appearance: alert, appears stated age and cooperative  Good eye contact and social smile speech normal fluency and content.  Lungs: clear to auscultation bilaterally  Heart: regular rate and rhythm, S1, S2 normal, no murmur, click, rub or gallop  Patient declines pelvic exam today.    No results found for this or any previous visit (from the past 168 hour(s)).       This note has been dictated using voice recognition software. Any grammatical or context distortions are unintentional and inherent to the software  "

## 2021-05-30 VITALS — HEIGHT: 68 IN | WEIGHT: 182 LBS | BODY MASS INDEX: 27.58 KG/M2

## 2021-05-31 VITALS — WEIGHT: 181 LBS | BODY MASS INDEX: 27.43 KG/M2 | HEIGHT: 68 IN

## 2021-05-31 VITALS — BODY MASS INDEX: 25.79 KG/M2 | WEIGHT: 170.19 LBS | HEIGHT: 68 IN

## 2021-06-02 VITALS — HEIGHT: 68 IN | BODY MASS INDEX: 27.28 KG/M2 | WEIGHT: 180 LBS

## 2021-06-02 VITALS — WEIGHT: 219.13 LBS | BODY MASS INDEX: 33.81 KG/M2

## 2021-06-02 VITALS — WEIGHT: 186 LBS | BODY MASS INDEX: 28.7 KG/M2

## 2021-06-02 VITALS — BODY MASS INDEX: 32.71 KG/M2 | WEIGHT: 212 LBS

## 2021-06-02 VITALS — BODY MASS INDEX: 32.41 KG/M2 | WEIGHT: 210 LBS

## 2021-06-02 VITALS — WEIGHT: 194 LBS | BODY MASS INDEX: 29.94 KG/M2

## 2021-06-02 VITALS — WEIGHT: 219.25 LBS | BODY MASS INDEX: 33.83 KG/M2

## 2021-06-02 VITALS — WEIGHT: 224 LBS | BODY MASS INDEX: 34.57 KG/M2

## 2021-06-02 VITALS — WEIGHT: 223 LBS | BODY MASS INDEX: 34.41 KG/M2

## 2021-06-03 VITALS — BODY MASS INDEX: 30.31 KG/M2 | WEIGHT: 200 LBS | HEIGHT: 68 IN

## 2021-06-03 NOTE — TELEPHONE ENCOUNTER
Duplicate message.   [employer] message has already been sent through pt's chart, it has been sent to the provider.

## 2021-06-04 NOTE — PROGRESS NOTES
"Assessment/Plan:    Klaey Mills is a 34 y.o. female presenting for:    1. Impetigo  Mupirocin sent to the pharmacy.  She has used this well in the past.  I did send an order of Keflex to the pharmacy as well but I encouraged her to use mupirocin for a few days prior to initiating the Keflex.  She is in agreement with this plan.  I did encourage her to use mupirocin intranasally twice daily for 10 to 14 days due to her concern for MRSA.  - cephalexin (KEFLEX) 500 MG capsule; Take 1 capsule (500 mg total) by mouth 3 (three) times a day for 7 days.  Dispense: 21 capsule; Refill: 0  - mupirocin (BACTROBAN) 2 % ointment; Apply to affected area TID for ten days.  Dispense: 22 g; Refill: 2    2. Nausea  Zofran sent to the pharmacy.  Note for work was given.  Likely viral gastroenteritis.  - ondansetron (ZOFRAN-ODT) 4 MG disintegrating tablet; Take 1 tablet (4 mg total) by mouth every 8 (eight) hours as needed for nausea.  Dispense: 12 tablet; Refill: 0        Medications Discontinued During This Encounter   Medication Reason     mupirocin (BACTROBAN) 2 % ointment Reorder     prenatal vit 91/iron/folic/dha (PRENATAL + DHA ORAL)            Chief Complaint:  Chief Complaint   Patient presents with     Vomiting     Vomiting & diarrhea yesterday.  She also had muscle aches and abdominal cramping     Sore     Sore on nose, pt states she has had cellulitis in the past and it feels the same     Note     Needs a doctors note        Subjective:   Kaley Mills is a 34-year-old female presenting today for several concerns:    1.  Vomiting and diarrhea: This is afflicted several members of her family.  Yesterday she states that she could not keep anything down.  Today she is keeping fluids down.  She was feeling fairly weak yesterday but feeling slightly better today.  She is somewhat adverse to using Zofran but does want something \"to help me.\"  She states that Zofran has not really helped in the past although she cannot really " think of a specific time when she had used it.    2.  Cellulitis: Patient notes that she will occasionally get a sore on the right side of her nose.  This will often turn into impetigo.  She is used mupirocin in the past for this however is not using it currently as this had run out.  She has not had this sore for the last week.  She thinks it is getting worse.  She is hopeful to get some mupirocin and possibly a oral antibiotic.  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  She states that whenever she works at a certain spot she will begin getting the sore.  She does not think it is related to stress.    12 point review of systems completed and negative except for what has been described above    Social History     Tobacco Use   Smoking Status Former Smoker     Types: Cigarettes   Smokeless Tobacco Never Used       Current Outpatient Medications   Medication Sig     acetaminophen (TYLENOL) 500 MG tablet Take 2 tablets (1,000 mg total) by mouth every 6 (six) hours as needed.     ibuprofen (ADVIL,MOTRIN) 800 MG tablet Take 1 tablet (800 mg total) by mouth every 8 (eight) hours.     cephalexin (KEFLEX) 500 MG capsule Take 1 capsule (500 mg total) by mouth 3 (three) times a day for 7 days.     EPINEPHrine (EPIPEN/ADRENACLICK/AUVI-Q) 0.3 mg/0.3 mL injection Inject 0.3 mL (0.3 mg total) into the shoulder, thigh, or buttocks once as needed.     mupirocin (BACTROBAN) 2 % ointment Apply to affected area TID for ten days.     ondansetron (ZOFRAN-ODT) 4 MG disintegrating tablet Take 1 tablet (4 mg total) by mouth every 8 (eight) hours as needed for nausea.         Objective:  Vitals:    12/30/19 0940   BP: 116/74   Pulse: 88   Temp: 97.9  F (36.6  C)   TempSrc: Axillary       There is no height or weight on file to calculate BMI.    Vital signs reviewed and stable  General: No acute distress  Psych: Appropriate affect  HEENT: moist mucous membranes  Cardiovascular: regular rate and rhythm with no  murmur  Pulmonary: clear to auscultation bilaterally with no wheeze  Abdomen: soft, non tender, non distended with normo-active bowel sounds  Extremities: warm and well perfused with no edema  Skin: warm and dry with a somewhat crusted rash on the right side of the end of her nose with some slight honey colored crusting and drainage         This note has been dictated and transcribed using voice recognition software.   Any errors in transcription are unintentional and inherent to the software.

## 2021-06-09 NOTE — PROGRESS NOTES
Assessment/ Plan     1. Routine general medical examination at a health care facility  As far as healthcare maintenance, she is requesting a Pap smear today.  She recently got out of the relationship and just wants to make sure everything is normal.  - Gynecologic Cytology (PAP Smear)    2. Fatigue  Patient complaining of fatigue and weight gain, likely related to stress.  Discussed some strategies for getting back into an exercise program and tracking calories and carbohydrates.  Discussed she is not a candidate for weight loss medication.  I also do not think she meets criteria for major depression and would not recommend starting medication.  Recommend that she start therapy to help with coping strategies.  - Basic Metabolic Panel  - HM2(CBC w/o Differential)  - Thyroid Stimulating Hormone (TSH)  - Vitamin D, Total (25-Hydroxy)    3. Screen for STD (sexually transmitted disease)  Patient would like screening for STD as she just got out of a relationship.  - Syphilis Screen, Cascade  - HIV Antigen/Antibody Screening Cascade  - Hepatitis C Antibody (Anti-HCV)  - Chlamydia trachomatis & Neisseria gonorrhoeae, Amplified Detection    4. Tobacco abuse  Patient smokes approximately 5 cigarettes per week as stress relief.  Recommend she try to find a better stress outlet.  She was wondering about Wellbutrin, I just do not think she would needed to quit smoking, certainly would not need patches at this point.  Recommend that she try quitting smoking on her own as she is such an infrequent smoker.  If she is really struggling, could call me back and would consider prescription for Wellbutrin.    Subjective:     Kaley Mills is a 32 y.o. female who presents for an annual exam.  She is new to the [facility] today.  She comes in today for a physical.  Her main concern is some weight gain over the last couple of years.  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  It is hard  "for her to find the time and energy to work out.  She feels like \"I am just not as happy as I think that should be\".  She is also smoking about 5 cigarettes per day.  She is wanting something to help her quit smoking.  She has not really tried anything in the past.  I discussed with her she smokes such a low amount, would not need patches for withdrawal symptoms.  Also would not suggest Wellbutrin.  She is wondering about a pill she can take to help her \"boost her tablets\".  I also discussed with her she would not meet criteria for weight loss medication.  She does need to figure out a way to get back into her exercise routine.  She thinks that maybe she is depressed, we discussed this further just sounds like she is not coping with stress very well.  She scored a 7 on her PHQ 9, only a 1 on anhedonia question or the feeling down question.  We discussed at length that metabolism can slow as you get older.  Recommend that she track her calories using an iPhone stephanie and also watching carbohydrates.  Would have her brainstorm to see if she can figure out a way to get her back into her regular workouts.  I recommend seeing a  or a  through [facility] wellness.  That would be more appropriate for her than this to water weight loss program.  Family history significant for mother with breast cancer in her 50s.    Healthy Habits:   Regular Exercise: Yes  Sunscreen Use: Yes  Healthy Diet: Yes  Dental Visits Regularly: Yes  Seat Belt: Yes  Sexually active: No  Self Breast Exam Monthly:No  Colonoscopy: No  Lipid Profile: No  Glucose Screen: No  Prevention of Osteoporosis: No  Last Dexa: No        Immunization History   Administered Date(s) Administered     DTaP, historic 1986, 1986, 1986, 12/09/1987, 01/08/1991     HPV Quadrivalent 02/13/2008, 05/02/2008, 08/18/2008     Hep B, historic 08/28/1998, 10/29/1998, 10/21/2002     Hib (PRP-T) 09/16/1988     IPV 1986, 1986, 12/09/1987, " "01/08/1991     MMR 04/14/1987, 08/28/1998     Tdap 12/05/2011     Immunization status: up to date and documented.     Gynecologic History  No LMP recorded.  Contraception: none  Last Pap: 2015. Results were: normal  Last mammogram: age 31. Results were: normal      OB History   No data available       No current outpatient prescriptions on file.     No current facility-administered medications for this visit.      No past medical history on file.  No past surgical history on file.  Bee pollen  No family history on file.  Social History     Social History     Marital status: Single     Spouse name: N/A     Number of children: N/A     Years of education: N/A     Occupational History     Not on file.     Social History Main Topics     Smoking status: Current Every Day Smoker     Types: Cigarettes     Smokeless tobacco: Not on file     Alcohol use Not on file     Drug use: Not on file     Sexual activity: Not on file     Other Topics Concern     Not on file     Social History Narrative       Review of Systems  General:  Denies problems  Eyes:  Denies problems  Ears/Nose/Throat:  Denies problems  Cardiovascular:  Denies problems  Respiratory:  Denies problems  Gastrointestinal:  Denies problems  Genitourinary:  Denies problems  Musculoskeletal:  Denies problems  Skin:  Denies problems  Neurologic:  Denies problems  Psychiatric:  Denies problems  Endocrine:  Denies problems  Heme/Lymphatic:  Denies problems  Allergic/Immunologic:  Denies problems    Objective:      Vitals:    03/09/17 1315   BP: 112/68   Pulse: 76   Resp: 12   Temp: 98.2  F (36.8  C)   TempSrc: Oral   Weight: 182 lb (82.6 kg)   Height: 5' 7.5\" (1.715 m)       Physical Exam:  General Appearance: Alert, cooperative, no distress, appears stated age  Head: Normocephalic, without obvious abnormality, atraumatic  Eyes: PERRL, conjunctiva/corneas clear, EOM's intact  Ears: Normal TM's and external ear canals, both ears  Nose: Nares normal, septum midline,mucosa " normal, no drainage  Throat: Lips, mucosa, and tongue normal; teeth and gums normal  Neck: Supple, symmetrical, trachea midline, no adenopathy; thyroid: not enlarged, symmetric, no tenderness/mass/nodules; no carotid bruit  Back: Symmetric, no curvature, ROM normal, no CVA tenderness  Lungs: Clear to auscultation bilaterally, respirations unlabored  Breasts: No breast masses, tenderness, asymmetry, or nipple discharge  Heart: Regular rate and rhythm, S1 and S2 normal, no murmur, rub, or gallop, Abdomen: Soft, non-tender, bowel sounds active all four quadrants,  no masses, no organomegaly  Pelvic: Normally developed genitalia with no external lesions or eruptions. Vagina and cervix show no lesions, inflammation, discharge or tenderness. Uterus normal to palpation.  No adnexal mass or tenderness.  Extremities: Extremities normal, atraumatic, no cyanosis or edema  Skin: Skin color, texture, turgor normal, no rashes or lesions  Lymph nodes: Cervical, supraclavicular, and axillary nodes normal  Neurologic: Normal      The following high BMI interventions were performed this visit: encouragement to exercise and dietary needs education    Results for orders placed or performed in visit on 04/06/15   GYNECOLOGIC CYTOLOGY (PAP SMEAR)   Result Value Ref Range    Case Report       Gynecologic Cytology Report                       Case: [id number]                                   --------------------------------------------------------------------------------------------------  Authorizing Provider:  Hamida Lee,    Ordering Provider:   Hamida Lee CNM CNM                                                                          First Screen:          PAULETTE Weldon   Collected:           4/6/2015 1601                                       (ASCP)                                                                                                                          Received:            4/7/2015 1005                                                                                                                    Specimen:    SUREPATH PAP, SCREENING, Endocervical/cervical                                             Interpretation       Negative for squamous intraepithelial lesion or malignancy    Result Flag Normal Normal    Other Findings       Shift in vaginal kassidy suggestive of bacterial vaginosis    Specimen Adequacy       Satisfactory for evaluation, endocervical/transformation zone component present    HPV Reflex? Yes if ASCUS     HIGH RISK No     LMP/Menopause Date 3-     Abnormal Bleeding No     Pt Status na     Birth Control/Hormones Pill/patch/ring     Previous Normal/Date 2012     Prev Abn Date/Dx na     Cervical Appearance neg        Mera Keita MD    This note has been dictated using voice recognition software. Any grammatical or context distortions are unintentional and inherent to the software

## 2021-06-12 NOTE — PROGRESS NOTES
Assessment/ Plan     1. Sore throat  Patient's rapid strep is negative.  Backup culture is sent and she will be notified only if positive. Discussed with the patient that their symptoms are consistent with a viral process.  No antibiotics are warranted at this time.  Discussed symptomatic cares they can try at home.  Recommended rest, push fluids, and ibuprofen or tylenol if fevers.  Advised to follow up in clinic if no improvement is symptoms over the next 1-2 weeks.  Would want to see them back sooner if have persistent fevers or worsening symptoms.    - Rapid Strep A Screen-Throat  - Group A Strep, RNA Direct Detection, Throat      Subjective:       Kaley Mills is a 32 y.o. female who presents for 3 day history of URI type symptoms.  She has had a sore throat, swollen glands, cough, and some tightness in her chest.  The cough has been nonproductive.  She has had no fevers.  No sick exposures that she knows of.  Her neck has been a little stiff but she can move it all around.  She has no nausea, vomiting, or diarrhea.  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  She has had some facial pain and pressure and some tooth pain.  She states that she sometimes gets sinus infections.  Discussed with her that if the strep is negative, this is a viral process.  Normally bacterial sinusitis does not begin until day 10-14 of an illness.  Discussed some things with her she can try over-the-counter such as Flonase, Mucinex, or Delsym.    Relevant past medical, family, surgical, and social history reviewed with patient, unless noted in HPI, not pertinent for this visit.    Review of Systems   A 12 point comprehensive review of systems was negative except as noted.      Current Outpatient Prescriptions   Medication Sig Dispense Refill     EPINEPHrine (EPIPEN) 0.3 mg/0.3 mL atIn Inject 0.3 mL (0.3 mg total) into the shoulder, thigh, or buttocks once as needed. 2  "Pre-filled Pen Syringe 2     No current facility-administered medications for this visit.        Objective:      /60 (Patient Site: Right Arm, Patient Position: Sitting, Cuff Size: Adult Regular)  Pulse 80  Temp 99  F (37.2  C) (Oral)   Resp 12  Ht 5' 7.5\" (1.715 m)  Wt 181 lb (82.1 kg)  LMP 08/14/2017 (Approximate)  BMI 27.93 kg/m2      General appearance: alert, appears stated age and cooperative  Head: Normocephalic, without obvious abnormality, atraumatic  Eyes: conjunctivae/corneas clear.   Ears: normal TM's and external ear canals both ears  Nose: Nares normal. Septum midline. Mucosa normal. No drainage or sinus tenderness.  Throat: lips, mucosa, and tongue normal; teeth and gums normal  Neck: no adenopathy, full range of motion without difficulty  Lungs: clear to auscultation bilaterally  Heart: regular rate and rhythm, S1, S2 normal, no murmur, click, rub or gallop      Recent Results (from the past 168 hour(s))   Rapid Strep A Screen-Throat   Result Value Ref Range    Rapid Strep A Antigen No Group A Strep detected, presumptive negative No Group A Strep detected, presumptive negative          This note has been dictated using voice recognition software. Any grammatical or context distortions are unintentional and inherent to the software  "

## 2021-06-15 NOTE — PROGRESS NOTES
"Assessment/Plan:    Kaley Mills is a 33 y.o. female presenting for:    1. Cough  Influenza test is negative today.  Chest x-ray is normal per my read as well as the radiologist final read.  The patient's is somewhat unsatisfied with these answers and would like me to do something to \"help\" her.  I did offer some cough medicine with codeine but she states that she got some NyQuil and will try that.  Note for work was given to excuse to excuse her for the next few days.  Encourage rest.  Encouraged her to drink non-caffeinated fluids.  - Influenza A/B Rapid Test  - XR Chest 2 Views    2. Anxiety  The patient's anxiety does seem to be work-related.  I discussed with the patient that certainly an antidepressant may help her cope slightly better with her anxiety but she is not really having panic attacks at this point I do not think that she really needs to take a medication.  She would likely benefit from seeing a therapist and I recommended that her today but she states that she does not think that they would do anything to help her.  That being said, she continues to ask me how she should deal with different situations at work and I do think that seeing a therapist would likely be helpful.  I did discuss with the patient that if there are certain people at work that she feels are treating her unfairly that she should talk with her human resources department about it.    We discussed the risks and benefits of the Zoloft with the risks mostly being headache, nausea and drowsiness.  She would like to know all of the risks today and I stated that she could certainly go home and read about them on the packet that comes with the medication.  - sertraline (ZOLOFT) 50 MG tablet; 1/2 daily for 2 weeks and then increase to a full tablet daily  Dispense: 30 tablet; Refill: 2        There are no discontinued medications.        Chief Complaint:  Chief Complaint   Patient presents with     Cough     Sxs started on Friday " "morning- unsure of fever     Chills     Hot and cold sweats     Generalized Body Aches     Nasal Congestion     Headache     Fatigue     Stress     Work related stress- dragging her down- wearing on her       Subjective:   Kaley Mills is a 33-year-old female presenting to the clinic today for several concerns:    1.  Generally feeling unwell: Patient notes that over the weekend she began to feel somewhat unwell with a runny nose and some slight dizziness.  She notes that she was painting inside on Sunday and became very sweaty.  She denies any chest pain or shortness of breath.  She has had a slight sore throat as well as a mild cough as well.  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  She does not think that she has had fevers but has had some hot and cold sweats.  Generalized body aches and nasal congestion.  She also notes a fairly generalized headache as well as some fatigue.  She states that when she takes a deep breath her lungs \"hurt all over.\"    2.  Stress and anxiety: Patient states that she is a CNA at [employer] hospital.  She finds her job to be very stressful.  She feels as though people do not treat her fairly sometimes it makes her \"job miserable.\"  She feels safe at work but states that she feels like she is pulled in many different directions.  She is not having any panic attacks.  She just wishes that \"work was not so difficult.\"  She states that every time she sits down some and will ask her to do something which she finds to be difficult.  She thinks that she is getting sick all the time because she is stressed out from work and wonders what I am going to do to help her about it today.  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  She feels as though she would like to be on a medication to help.    12 point review of systems completed and negative except for what has been described " "above    History   Smoking Status     Former Smoker     Types: Cigarettes   Smokeless Tobacco     Never Used       Current Outpatient Prescriptions   Medication Sig     EPINEPHrine (EPIPEN) 0.3 mg/0.3 mL atIn Inject 0.3 mL (0.3 mg total) into the shoulder, thigh, or buttocks once as needed.     sertraline (ZOLOFT) 50 MG tablet 1/2 daily for 2 weeks and then increase to a full tablet daily         Objective:  Vitals:    02/06/18 1012   BP: 100/78   Pulse: 88   Resp: 20   Temp: 98.6  F (37  C)   TempSrc: Oral   SpO2: 100%   Weight: 170 lb 3 oz (77.2 kg)   Height: 5' 7.5\" (1.715 m)       Vital signs reviewed and stable  General: No acute distress  Psych: Appropriate affect  HEENT: moist mucous membranes, pupils equal, round, reactive to light and accomodation, posterior oropharynx clear of erythema or exudate, tympanic membranes are pearly grey bilaterally  Lymph: no cervical or supraclavicular lymphadenopathy  Cardiovascular: regular rate and rhythm with no murmur  Pulmonary: clear to auscultation bilaterally with no wheeze  Abdomen: soft, non tender, non distended with normo-active bowel sounds  Extremities: warm and well perfused with no edema  Skin: warm and dry with no rash         This note has been dictated and transcribed using voice recognition software.   Any errors in transcription are unintentional and inherent to the software.  "

## 2021-06-20 NOTE — PROGRESS NOTES
Assessment/ Plan     1. Amenorrhea/pregnancy confirmation  Patient's urine pregnancy test was positive in the clinic today.  Her LMP started on July 26 giving her an EDC of 5/2/19.  This puts her at approximately 7 weeks 5 days.  Reviewed the entire OB packet with patient.  She still not sure where she wants to do her prenatal care and delivery.  If she plans on following up here, we will see her back in 4 weeks for her first OB visit.  She is up-to-date on Pap smear.  - Pregnancy (Beta-hCG, Qual), Urine      Subjective:       Kaley Mills is a 33 y.o. female who presents for pregnancy confirmation.  Patient states that her last menstrual period started around July 26.  She tends to have a regular menses.  She states that she was in a casual relationship with someone and knows the exact date of conception.  This would be August 10.  This person does not want to be involved in the pregnancy.  Patient states that she is thought about things and wants to keep the pregnancy.  She has a lot of questions today.  She is having a lot of common symptoms of early pregnancy including fatigue and nausea.  I reassured her that these are normal.  Also discussed increased smells can be normal.  I reviewed the entire OB packet with her.  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  I discussed with her that she can certainly do some research.  She initially asked about a home birth and I told her I do not do those and I do not recommend them.  She can certainly feel free to do some research on the subject if she would like to.  She is otherwise healthy without major medical problems.  She is not taking a prenatal.  I recommend that she start a prenatal and if that is making her nausea worse, make sure she takes a folic acid supplement.  She is also having some back pain and is wondering what she can take for pain.  I discussed with her using gentle  "heat and Tylenol.  Reviewed the medications that are safe in pregnancy with her.  Reviewed the normal course of prenatal visits.  Would plan to see her back in 1 month.  I discussed first trimester screening and she will think about things.    Relevant past medical, family, surgical, and social history reviewed with patient, unless noted in HPI, not pertinent for this visit.    Review of Systems   A 12 point comprehensive review of systems was negative except as noted.      Current Outpatient Prescriptions   Medication Sig Dispense Refill     EPINEPHrine (EPIPEN/ADRENACLICK) 0.3 mg/0.3 mL injection Inject 0.3 mL (0.3 mg total) into the shoulder, thigh, or buttocks once as needed. 2 Pre-filled Pen Syringe 2     No current facility-administered medications for this visit.        Objective:      /68  Pulse 80  Temp 98.3  F (36.8  C) (Oral)   Resp 16  Ht 5' 7.5\" (1.715 m)  Wt 180 lb (81.6 kg)  LMP 07/27/2018  BMI 27.78 kg/m2      General appearance: alert, appears stated age and cooperative         Recent Results (from the past 168 hour(s))   Pregnancy (Beta-hCG, Qual), Urine   Result Value Ref Range    Pregnancy Test, Urine Positive (!) Negative    Specific Gravity, UA 1.025 1.001 - 1.030          This note has been dictated using voice recognition software. Any grammatical or context distortions are unintentional and inherent to the software  "

## 2021-06-21 NOTE — PROGRESS NOTES
Doing well.  Her nausea has improved.  Had normal nuchal trans [facility].  Declines Quad screen today.  Order given for breast pump and 20 week fetal survey.  Plan to follow up in 4 weeks.

## 2021-06-21 NOTE — PROGRESS NOTES
PRENATAL VISIT   FIRST OBSTETRICAL EXAM - OB    Assessment / Impression       Normal first prenatal visit at 11w3d  Tobacco use  Discussed orientation, general information, lifestyle, nutrition, exercise,warning signs, resources, lab testing, risk screening and discussed cystic fibrosis screening with patient.  Questions answered.    Plan:     Recommend quitting all smoking   Initial labs drawn.  Prenatal vitamins.  Problem list reviewed and updated.  Genetic screening test options discussed:  Patient elects First trimester screen with nuchial translucency ultrasound  Role of ultrasound in pregnancy discussed; fetal survey: requested.  Follow up: 4 weeks      Subjective:    Kaley Mills is a 33 y.o.  here today for her First Obstetrical Exam.  She states that she is feeling better from a nausea standpoint.  We once again discussed first trimester screening and she would like to do the nuchal translucency test.  She does not think her insurance would cover the Gema Touch DNA test.  She is taking a prenatal vitamin.  She states that she is cut back on her smoking and we discussed trying to avoid all cigarette smoke.  We reviewed her past medical history and genetic history.  The father of the baby is not involved and is currently not talking to her so she cannot get any medical information.  She had her flu shot already at work.  We discussed routine prenatal visit it and how often will be seeing her.  We could not hear fetal heart tones today, but could see the fetus on handheld ultrasound and saw cardiac activity.        OB History    Para Term  AB Living   1        SAB TAB Ectopic Multiple Live Births             # Outcome Date GA Lbr Cm/2nd Weight Sex Delivery Anes PTL Lv   1 Current                   Expected Date of Delivery: 5/3/2019, by Last Menstrual Period    No past medical history on file.  No past surgical history on file.  Social History   Substance Use Topics     Smoking status:  Former Smoker     Types: Cigarettes     Smokeless tobacco: Never Used     Alcohol use None     Current Outpatient Prescriptions   Medication Sig Dispense Refill     EPINEPHrine (EPIPEN/ADRENACLICK) 0.3 mg/0.3 mL injection Inject 0.3 mL (0.3 mg total) into the shoulder, thigh, or buttocks once as needed. 2 Pre-filled Pen Syringe 2     prenatal vit 91/iron/folic/dha (PRENATAL + DHA ORAL) Take by mouth daily.       No current facility-administered medications for this visit.      Allergies   Allergen Reactions     Bee Venom Protein (Honey Bee)              High Risk Behavior: Currently unmarried    Review of Systems  General:  Denies problem  Eyes: Denies problem  Ears/Nose/Throat: Denies problem  Cardiovascular: Denies problem  Respiratory:  Denies problem  Gastrointestinal:  Denies problem, Genitourinary: Denies problem  Musculoskeletal:  Denies problem  Skin: Denies problem  Neurologic: Denies problem  Psychiatric: Denies problem  Endocrine: Denies problem  Heme/Lymphatic: Denies problem   Allergic/Immunologic: Denies problem       Objective:   Objective    Vitals:    10/15/18 1007   BP: 102/60   Weight: 186 lb (84.4 kg)     Physical Exam:  General Appearance: Alert, cooperative, no distress, appears stated age  Head: Normocephalic, without obvious abnormality, atraumatic  Eyes: PERRL, conjunctiva/corneas clear, EOM's intact  Ears: Normal TM's and external ear canals, both ears  Nose: Nares normal, septum midline,mucosa normal, no drainage  Throat: Lips, mucosa, and tongue normal; teeth and gums normal  Neck: Supple, symmetrical, trachea midline, no adenopathy;  thyroid: not enlarged, symmetric, no tenderness/mass/nodules; no carotid bruit or JVD  Back: Symmetric, no curvature, ROM normal, no CVA tenderness  Lungs: Clear to auscultation bilaterally, respirations unlabored  Heart: Regular rate and rhythm, S1 and S2 normal, no murmur, rub, or gallop, Abdomen: Soft, non-tender, bowel sounds active all four quadrants,   no masses, no organomegaly  Pelvic:Normally developed genitalia with no external lesions or eruptions. Vagina and cervix show no lesions, inflammation, discharge or tenderness. No cystocele, No rectocele. Uterus 12.  No adnexal mass or tenderness.  Extremities: Extremities normal, atraumatic, no cyanosis or edema  Skin: Skin color, texture, turgor normal, no rashes or lesions  Lymph nodes: Cervical, supraclavicular, and axillary nodes normal  Neurologic: Normal     Lab:   Results for orders placed or performed in visit on 09/18/18   Pregnancy (Beta-hCG, Qual), Urine   Result Value Ref Range    Pregnancy Test, Urine Positive (!) Negative    Specific Gravity, UA 1.025 1.001 - 1.030

## 2021-06-23 NOTE — PROGRESS NOTES
Patient has not been in now for 9 weeks.  When I asked her why, she states she was not sure when she was supposed to come back in.  She had a normal fetal survey.  She is having a baby girl.  We will plan on having her do her 1 hour glucose challenge today.  We will also check a hemoglobin.  The father the baby is not involved so I asked patient who will be her support system during labor.  She states that she prefers to labor alone.  I asked her to strongly reconsider that as I think it would be beneficial for her to have somebody in there with her.  We will plan to see her back in 4 weeks.  Discussed after that we see her every 2 weeks until 36 and then every week.  I encouraged her to make these appointments on her way out today.

## 2021-06-23 NOTE — TELEPHONE ENCOUNTER
Left message to call back for: Kaley  Information to relay to patient:      Please call Kaley and let her know that she failed the 1 hour glucose test.  I would like her to set up the 3-hour test at our lab sometime this week.  She should be fasting for this test and plan on spending 3 hours here at the clinic.

## 2021-06-23 NOTE — TELEPHONE ENCOUNTER
Reason for call:  Patient returning call to the clinic  Information relayed to patient:  Yes as instructed and patient agrees with plan to come in on Friday  Patient has additional questions:  No  If YES, what are your questions/concerns:  none  Okay to leave a detailed message?:  Yes

## 2021-06-23 NOTE — TELEPHONE ENCOUNTER
Please call Kaley and let her know that she failed the 1 hour glucose test.  I would like her to set up the 3-hour test at our lab sometime this week.  She should be fasting for this test and plan on spending 3 hours here at the clinic.

## 2021-06-24 NOTE — PROGRESS NOTES
"Patient feels well.  She is returning today to do her 3-hour glucose challenge as she failed her 1 hour.  We discussed breast-feeding today.  She states she is going to \"give it a try\".  She can get a breast pump through her insurance, but not until after the baby is born.  We discussed benefits of breast-feeding, rooming in, and skin to skin contact.  Plan will be to follow-up in 2 weeks.  "

## 2021-06-25 NOTE — PATIENT INSTRUCTIONS - HE
Patient Education   HEALTHY PREGNANCY CARE: 30-34 WEEKS PREGNANT    You have made it to the final months of your pregnancy. By now, your baby is starting to fill out with some fat under his skin, fuzzy hair on his shoulders, and is gaining 4 to 6 ounces per week.    Discuss any travel plans with your midwife or physician.    Review possible changes in sexuality during later pregnancy and discuss these with your midwife or physician, as well as your partner. Alternative love-making positions may be more comfortable.    Discuss labor and delivery issues with your midwife or physician. If you had a  birth in the past, discuss a trial of labor with your midwife or physician. He or she may ask that you sign a consent form, if you wish to have a vaginal birth after  (). Ask your midwife or physician to explain your options for managing pain during your labor and delivery. Sometimes, during the birth process, an episiotomy may need to be cut in the vagina to make the opening bigger or let the baby come out quicker. You may want to discuss the episiotomy and how often it is needed with your midwife or physician.    Plan for your baby's care by selecting a child health care provider (Family physician, Pediatrician, or Pediatric Nurse Practitioner). Practice installing an infant car seat correctly in the car. Ask for car seat information as needed and make sure it is safe and will work in the car your baby will ride in. You will need a car seat to bring your baby home from the hospital. Check the procedure for adding your baby to your health care plan. Review your decision about circumcision and ask for any information you need. As you buy and receive items for your baby, don't put a baby walker on your list. Walkers can be dangerous and can cause serious injury to your child. A safer option is a saucer-type play station, since it doesn't allow baby to travel across the floor.    Discuss your choices and  plans for birth control with your midwife or physician. Women who are breastfeeding can still become pregnant. Use a birth control method if you want to lower your pregnancy risk. Talk to your midwife or physician if you are considering permanent birth control, such as tubal ligation or Essure. You may need to complete a consent form 30 days prior to delivery in order to have this done after you deliver.    Continue to watch for signs and symptoms of preeclampsia:     Sudden swelling of your face, hands, or feet     New vision problems such as blurring, double vision, or flashing lights    A severe headache not relieved with acetaminophen (Tylenol)    Sharp or stabbing pain in your right or middle upper abdomen    Watch for signs and symptoms of premature labor:     Regular contractions. This means having about 6 or more within 1 hour, even after you have had a glass of water and are resting.     A backache that starts and stops regularly.    An increase or change in vaginal discharge, such as heavy, mucus-like, watery, or bloody discharge.     Your water breaks or leaks.    If you have any of the above symptoms or any other concerns, call your provider or their clinic staff at [facility] at Phone: [phone]. If it's after clinic hours, physician patients should call the [facility] at [phone] ([phone]); midwife patients should call their answering service at [phone].    How can you care for yourself at home?   You can refer to the Starting Out Right book or find it online at http://[email or web address]/stories/[email or web address]-[email or web address]-Right.pdf or http://www.[email or web address]/images/stories/flipbooks/[facility]-starting-out-right/[facility]-starting-out-right.html#p=8     You can sign up for a weekly parenting e-mail that gives support, tips and advice from health care professionals that starts with pregnancy and continues through the toddler years. To register, go  to [email or web address] at any time during your pregnancy.

## 2021-06-25 NOTE — PROGRESS NOTES
[facility] Clinic Note    Kaley Mills  1985   [id number]    Kaley Mills is a 34 y.o. female presenting to discuss the following:     CC:   Chief Complaint   Patient presents with     Routine Prenatal Visit       HPI:  Kaley presents today for routine OB care.  Pregnancy has been going well.  No specific concerns today.She is still feeling baby move. Is worried baby isn't vertex. Baby's movements are less prominent but still present.     ROS:   No headaches, no changes in vision, no chest pain, no acute shortness of breath, no RUQ abdominal pain, no cramping, no loss of fluids, trace lower extremity edema.    PMH:   Patient Active Problem List   Diagnosis     Bee sting reaction     Primigravida in third trimester     PSH:   No past surgical history on file.    MEDICATIONS:   Current Outpatient Medications on File Prior to Visit   Medication Sig Dispense Refill     EPINEPHrine (EPIPEN/ADRENACLICK) 0.3 mg/0.3 mL injection Inject 0.3 mL (0.3 mg total) into the shoulder, thigh, or buttocks once as needed. 2 Pre-filled Pen Syringe 2     prenatal vit 91/iron/folic/dha (PRENATAL + DHA ORAL) Take by mouth daily.       No current facility-administered medications on file prior to visit.        ALLERGIES:  Allergies   Allergen Reactions     Bee Venom Protein (Honey Bee)      PHYSICAL EXAM:   /80   Wt 219 lb 2 oz (99.4 kg)   LMP 2018   BMI 33.81 kg/m     GENERAL: Kaley is a pleasant, well appearing female, in no acute distress.   HEART: Regular rate and rhythm, no murmurs.  LUNGS: Clear to auscultation bilaterally, unlabored.   ABDOMEN: FH 34cm,  bpm. Leopolds consistent with vertex position, confirmed with POC US.    EXTREMITIES: Trace lower extremity edema     ASSESSMENT & PLAN:   Kaley Mills is a 34 y.o.  at 33w5d presenting today for JUHI visit.     1. Supervision of normal first pregnancy in third trimester  2. 33 weeks gestation of pregnancy  Pregnancy is going well.  Blood  pressure is well controlled.  No signs of  labor.  Continue monitoring for baby movement.  Recommended elevating legs as able, consider compression stockings to help with trace lower extremity edema.    RTC: Follow-up in 2 weeks with GBS swab.     Birdie Maciel DO

## 2021-07-03 NOTE — ADDENDUM NOTE
Addendum Note by Eileen Jimenez LPN at 6/10/2019  9:47 AM     Author: Eileen Jimenez LPN Service: -- Author Type: Licensed Nurse    Filed: 6/10/2019  9:47 AM Encounter Date: 6/9/2019 Status: Signed    : Eileen Jimenez LPN (Licensed Nurse)    Addended by: EILEEN JIMENEZ on: 6/10/2019 09:47 AM        Modules accepted: Orders

## 2021-07-03 NOTE — ADDENDUM NOTE
Addendum Note by Mera Brunson MD at 6/6/2019 10:20 AM     Author: Mera Brunson MD Service: -- Author Type: Physician    Filed: 6/6/2019 12:09 PM Encounter Date: 6/6/2019 Status: Signed    : Mera Brunson MD (Physician)    Addended by: MERA BRUNSON on: 6/6/2019 12:09 PM        Modules accepted: Orders

## 2021-07-04 ENCOUNTER — HEALTH MAINTENANCE LETTER (OUTPATIENT)
Age: 36
End: 2021-07-04

## 2021-07-05 ENCOUNTER — COMMUNICATION - HEALTHEAST (OUTPATIENT)
Dept: FAMILY MEDICINE | Facility: CLINIC | Age: 36
End: 2021-07-05

## 2021-07-05 DIAGNOSIS — Z30.09 GENERAL COUNSELING FOR PRESCRIPTION OF ORAL CONTRACEPTIVES: ICD-10-CM

## 2021-07-05 NOTE — TELEPHONE ENCOUNTER
Telephone Encounter by Israel Connell RN at 7/5/2021  9:48 AM     Author: Israel Connell RN Service: -- Author Type: Registered Nurse    Filed: 7/5/2021  9:48 AM Encounter Date: 7/5/2021 Status: Signed    : Israel Connell RN (Registered Nurse)       RN cannot approve Refill Request    RN can NOT refill this medication Protocol failed and NO refill given. Last office visit: 9/18/2018 Mera Keita MD Last Physical: Visit date not found Last MTM visit: Visit date not found Last visit same specialty: 12/30/2019 Hamida Waggoner MD.  Next visit within 3 mo: Visit date not found  Next physical within 3 mo: Visit date not found      [facility] Triage/Med Refill 7/5/2021    Requested Prescriptions   Pending Prescriptions Disp Refills   ? ASHA 0.25-35 mg-mcg per tablet [Pharmacy Med Name: ASHA 0.25-0.035 MG TABLET] 84 tablet 3     Sig: TAKE 1 TABLET BY MOUTH EVERY DAY       Oral Contraceptives Protocol Failed - 7/5/2021 12:32 AM        Failed - Visit with PCP or prescribing provider visit in last 12 months      Last office visit with prescriber/PCP: 9/18/2018 Mera Keita MD OR same dept: Visit date not found OR same specialty: 12/30/2019 Hamida Waggoner MD  Last physical: Visit date not found Last MTM visit: Visit date not found   Next visit within 3 mo: Visit date not found  Next physical within 3 mo: Visit date not found  Prescriber OR PCP: Mera Keita MD  Last diagnosis associated with med order: 1. General counseling for prescription of oral contraceptives  - ASHA 0.25-35 mg-mcg per tablet [Pharmacy Med Name: ASHA 0.25-0.035 MG TABLET]; TAKE 1 TABLET BY MOUTH EVERY DAY  Dispense: 84 tablet; Refill: 3    If protocol passes may refill for 12 months if within 3 months of last provider visit (or a total of 15 months).

## 2021-07-14 PROBLEM — Z34.90 PREGNANT: Status: RESOLVED | Noted: 2019-04-24 | Resolved: 2019-04-25

## 2021-07-14 PROBLEM — Z34.03 PRIMIGRAVIDA IN THIRD TRIMESTER: Status: RESOLVED | Noted: 2018-11-26 | Resolved: 2019-04-25

## 2021-10-24 ENCOUNTER — HEALTH MAINTENANCE LETTER (OUTPATIENT)
Age: 36
End: 2021-10-24

## 2022-03-28 ENCOUNTER — OFFICE VISIT (OUTPATIENT)
Dept: FAMILY MEDICINE | Facility: CLINIC | Age: 37
End: 2022-03-28
Payer: COMMERCIAL

## 2022-03-28 VITALS
HEIGHT: 68 IN | WEIGHT: 192.6 LBS | BODY MASS INDEX: 29.19 KG/M2 | RESPIRATION RATE: 20 BRPM | HEART RATE: 78 BPM | OXYGEN SATURATION: 98 % | SYSTOLIC BLOOD PRESSURE: 108 MMHG | TEMPERATURE: 97.6 F | DIASTOLIC BLOOD PRESSURE: 80 MMHG

## 2022-03-28 DIAGNOSIS — Z00.00 ROUTINE GENERAL MEDICAL EXAMINATION AT A HEALTH CARE FACILITY: Primary | ICD-10-CM

## 2022-03-28 DIAGNOSIS — M67.442 GANGLION CYST OF FLEXOR TENDON SHEATH OF FINGER OF LEFT HAND: ICD-10-CM

## 2022-03-28 DIAGNOSIS — M54.42 ACUTE LEFT-SIDED LOW BACK PAIN WITH LEFT-SIDED SCIATICA: ICD-10-CM

## 2022-03-28 DIAGNOSIS — F34.1 DYSTHYMIA: ICD-10-CM

## 2022-03-28 DIAGNOSIS — Z12.4 CERVICAL CANCER SCREENING: ICD-10-CM

## 2022-03-28 DIAGNOSIS — R63.5 WEIGHT GAIN: ICD-10-CM

## 2022-03-28 LAB
ANION GAP SERPL CALCULATED.3IONS-SCNC: 7 MMOL/L (ref 3–14)
BASOPHILS # BLD AUTO: 0 10E3/UL (ref 0–0.2)
BASOPHILS NFR BLD AUTO: 0 %
BUN SERPL-MCNC: 12 MG/DL (ref 7–30)
CALCIUM SERPL-MCNC: 9.2 MG/DL (ref 8.5–10.1)
CHLORIDE BLD-SCNC: 107 MMOL/L (ref 94–109)
CO2 SERPL-SCNC: 25 MMOL/L (ref 20–32)
CREAT SERPL-MCNC: 0.77 MG/DL (ref 0.52–1.04)
EOSINOPHIL # BLD AUTO: 0.1 10E3/UL (ref 0–0.7)
EOSINOPHIL NFR BLD AUTO: 2 %
ERYTHROCYTE [DISTWIDTH] IN BLOOD BY AUTOMATED COUNT: 12.8 % (ref 10–15)
GFR SERPL CREATININE-BSD FRML MDRD: >90 ML/MIN/1.73M2
GLUCOSE BLD-MCNC: 115 MG/DL (ref 70–99)
HCT VFR BLD AUTO: 44 % (ref 35–47)
HGB BLD-MCNC: 14.6 G/DL (ref 11.7–15.7)
LYMPHOCYTES # BLD AUTO: 1.9 10E3/UL (ref 0.8–5.3)
LYMPHOCYTES NFR BLD AUTO: 39 %
MCH RBC QN AUTO: 33 PG (ref 26.5–33)
MCHC RBC AUTO-ENTMCNC: 33.2 G/DL (ref 31.5–36.5)
MCV RBC AUTO: 100 FL (ref 78–100)
MONOCYTES # BLD AUTO: 0.6 10E3/UL (ref 0–1.3)
MONOCYTES NFR BLD AUTO: 13 %
NEUTROPHILS # BLD AUTO: 2.2 10E3/UL (ref 1.6–8.3)
NEUTROPHILS NFR BLD AUTO: 46 %
PLATELET # BLD AUTO: 275 10E3/UL (ref 150–450)
POTASSIUM BLD-SCNC: 4.3 MMOL/L (ref 3.4–5.3)
RBC # BLD AUTO: 4.42 10E6/UL (ref 3.8–5.2)
SODIUM SERPL-SCNC: 139 MMOL/L (ref 133–144)
TSH SERPL DL<=0.005 MIU/L-ACNC: 1.51 MU/L (ref 0.4–4)
WBC # BLD AUTO: 4.8 10E3/UL (ref 4–11)

## 2022-03-28 PROCEDURE — 80048 BASIC METABOLIC PNL TOTAL CA: CPT | Performed by: PHYSICIAN ASSISTANT

## 2022-03-28 PROCEDURE — 36415 COLL VENOUS BLD VENIPUNCTURE: CPT | Performed by: PHYSICIAN ASSISTANT

## 2022-03-28 PROCEDURE — 99214 OFFICE O/P EST MOD 30 MIN: CPT | Mod: 25 | Performed by: PHYSICIAN ASSISTANT

## 2022-03-28 PROCEDURE — 84443 ASSAY THYROID STIM HORMONE: CPT | Performed by: PHYSICIAN ASSISTANT

## 2022-03-28 PROCEDURE — 85025 COMPLETE CBC W/AUTO DIFF WBC: CPT | Performed by: PHYSICIAN ASSISTANT

## 2022-03-28 PROCEDURE — 99395 PREV VISIT EST AGE 18-39: CPT | Performed by: PHYSICIAN ASSISTANT

## 2022-03-28 RX ORDER — IBUPROFEN 800 MG/1
800 TABLET, FILM COATED ORAL EVERY 8 HOURS PRN
Qty: 60 TABLET | Refills: 0 | Status: SHIPPED | OUTPATIENT
Start: 2022-03-28 | End: 2023-05-10

## 2022-03-28 RX ORDER — ESCITALOPRAM OXALATE 10 MG/1
TABLET ORAL
Qty: 40 TABLET | Refills: 0 | Status: SHIPPED | OUTPATIENT
Start: 2022-03-28 | End: 2022-04-29

## 2022-03-28 RX ORDER — PREDNISONE 20 MG/1
20 TABLET ORAL 2 TIMES DAILY
Qty: 14 TABLET | Refills: 0 | Status: SHIPPED | OUTPATIENT
Start: 2022-03-28 | End: 2022-04-04

## 2022-03-28 ASSESSMENT — ENCOUNTER SYMPTOMS
NAUSEA: 0
HEARTBURN: 0
EYE PAIN: 0
BREAST MASS: 0
CHILLS: 0
FEVER: 0
COUGH: 0
JOINT SWELLING: 0
CONSTIPATION: 0
ABDOMINAL PAIN: 0
HEMATOCHEZIA: 0
ARTHRALGIAS: 0
DIZZINESS: 0
DIARRHEA: 0
SORE THROAT: 0
WEAKNESS: 0
HEADACHES: 0
PARESTHESIAS: 0
NERVOUS/ANXIOUS: 0
MYALGIAS: 1
PALPITATIONS: 0
HEMATURIA: 0
DYSURIA: 0
FREQUENCY: 0
SHORTNESS OF BREATH: 0

## 2022-03-28 ASSESSMENT — PAIN SCALES - GENERAL: PAINLEVEL: EXTREME PAIN (8)

## 2022-03-28 NOTE — PROGRESS NOTES
SUBJECTIVE:   CC: Kaley Mills is an 37 year old woman who presents for preventive health visit.       Patient has been advised of split billing requirements and indicates understanding: Yes  Healthy Habits:     Getting at least 3 servings of Calcium per day:  Yes    Bi-annual eye exam:  NO    Dental care twice a year:  NO    Sleep apnea or symptoms of sleep apnea:  None    Diet:  Carbohydrate counting    Frequency of exercise:  4-5 days/week    Duration of exercise:  45-60 minutes    Taking medications regularly:  Yes    Medication side effects:  None    PHQ-2 Total Score: 1    Additional concerns today:  Yes          Back Pain  - Low back pain  - h/o a disk bulge/protrusion at L4-L5 some mild radicular symptoms into her left buttock/posterior thigh.  - off and on several years    Weight Concerns  Difficulty losing weight since a recent pregnancy. Mild depression. No profound fatigue or constipation.   She is very active and exercises routinely. Watches her caloric intake.    Left hand concern  - 2nd finger - bump      Today's PHQ-2 Score:   PHQ-2 ( 1999 Pf[employer]) 3/28/2022   Q1: Little interest or pleasure in doing things 0   Q2: Feeling down, depressed or hopeless 1   PHQ-2 Score 1   Q1: Little interest or pleasure in doing things Not at all   Q2: Feeling down, depressed or hopeless Several days   PHQ-2 Score 1       Abuse: Current or Past (Physical, Sexual or Emotional) - No  Do you feel safe in your environment? Yes    Have you ever done Advance Care Planning? (For example, a Health Directive, POLST, or a discussion with a medical provider or your loved ones about your wishes): No, advance care planning information given to patient to review.  Advanced care planning was discussed at today's visit.    Social History     Tobacco Use     Smoking status: Former Smoker     Types: Cigarettes     Smokeless tobacco: Never Used   Substance Use Topics     Alcohol use: Never     Comment: 1 time a month (4-6)          Alcohol Use 3/28/2022   Prescreen: >3 drinks/day or >7 drinks/week? No   Prescreen: >3 drinks/day or >7 drinks/week? -       Reviewed orders with patient.  Reviewed health maintenance and updated orders accordingly - Yes  Lab work is in process  Labs reviewed in EPIC  BP Readings from Last 3 Encounters:   03/28/22 108/80   12/30/19 116/74   12/05/11 115/66    Wt Readings from Last 3 Encounters:   03/28/22 87.4 kg (192 lb 9.6 oz)   06/06/19 90.7 kg (200 lb)   04/19/19 101.2 kg (223 lb)                  Patient Active Problem List   Diagnosis     Bee sting reaction     CARDIOVASCULAR SCREENING; LDL GOAL LESS THAN 160     LTBI (latent tuberculosis infection)     Smoker     Past Surgical History:   Procedure Laterality Date     ORTHOPEDIC SURGERY      Tumor in right hand     TONSILLECTOMY       WISDOM TOOTH EXTRACTION Bilateral        Social History     Tobacco Use     Smoking status: Former Smoker     Types: Cigarettes     Smokeless tobacco: Never Used   Substance Use Topics     Alcohol use: Never     Comment: 1 time a month (4-6)     Family History   Problem Relation Age of Onset     Breast Cancer Mother      Breast Cancer Maternal Grandfather          Current Outpatient Medications   Medication Sig Dispense Refill     EPINEPHrine 0.3 MG/0.3ML SOLN Inject 1 Units as directed. For allergic reaction. 3 each 1     escitalopram (LEXAPRO) 10 MG tablet Take 1/2 tab daily for one week, then increase to 1 full tab daily thereafter 40 tablet 0     predniSONE (DELTASONE) 20 MG tablet Take 1 tablet (20 mg) by mouth 2 times daily for 7 days 14 tablet 0     Allergies   Allergen Reactions     Bee Venom      No lab results found.     Breast Cancer Screening:  Any new diagnosis of family breast, ovarian, or bowel cancer? No    FHS-7:   Breast CA Risk Assessment (FHS-7) 3/28/2022   Did any of your first-degree relatives have breast or ovarian cancer? Yes   Did any of your relatives have bilateral breast cancer? No     click  delete button to remove this line now  Patient under 40 years of age: Routine Mammogram Screening not recommended.   Pertinent mammograms are reviewed under the imaging tab.    History of abnormal Pap smear: NO - age 30- 65 PAP every 3 years recommended  PAP / HPV 3/9/2017 4/6/2015 12/5/2011   PAP Negative for squamous intraepithelial lesion or malignancy  Electronically signed by Blank Chaudhary, CT (ASCP) on 3/15/2017 at  2:55 PM   Negative for squamous intraepithelial lesion or malignancy  Electronically signed by iTera Alvarenga, CT (ASCP) on 4/8/2015 at 10:27 AM   -   PAP (Historical) - - NIL     Reviewed and updated as needed this visit by clinical staff   Tobacco  Allergies  Meds   Med Hx  Surg Hx  Fam Hx  Soc Hx        Reviewed and updated as needed this visit by Provider                 Past Medical History:   Diagnosis Date     Childhood asthma       Past Surgical History:   Procedure Laterality Date     ORTHOPEDIC SURGERY      Tumor in right hand     TONSILLECTOMY       WISDOM TOOTH EXTRACTION Bilateral        Review of Systems   Constitutional: Negative for chills and fever.   HENT: Negative for congestion, ear pain, hearing loss and sore throat.    Eyes: Negative for pain and visual disturbance.   Respiratory: Negative for cough and shortness of breath.    Cardiovascular: Negative for chest pain, palpitations and peripheral edema.   Gastrointestinal: Negative for abdominal pain, constipation, diarrhea, heartburn, hematochezia and nausea.   Breasts:  Negative for tenderness, breast mass and discharge.   Genitourinary: Negative for dysuria, frequency, genital sores, hematuria, pelvic pain, urgency, vaginal bleeding and vaginal discharge.   Musculoskeletal: Positive for myalgias. Negative for arthralgias and joint swelling.   Skin: Negative for rash.   Neurological: Negative for dizziness, weakness, headaches and paresthesias.   Psychiatric/Behavioral: Positive for mood changes. The patient  "is not nervous/anxious.      CONSTITUTIONAL: NEGATIVE for fever, chills, change in weight  INTEGUMENTARU/SKIN: NEGATIVE for worrisome rashes, moles or lesions  EYES: NEGATIVE for vision changes or irritation  ENT: NEGATIVE for ear, mouth and throat problems  RESP: NEGATIVE for significant cough or SOB  BREAST: NEGATIVE for masses, tenderness or discharge  CV: NEGATIVE for chest pain, palpitations or peripheral edema  GI: NEGATIVE for nausea, abdominal pain, heartburn, or change in bowel habits  : NEGATIVE for unusual urinary or vaginal symptoms. Periods are regular.  MUSCULOSKELETAL: NEGATIVE for significant arthralgias or myalgia  NEURO: NEGATIVE for weakness, dizziness or paresthesias  PSYCHIATRIC: NEGATIVE for changes in mood or affect     OBJECTIVE:   /80   Pulse 78   Temp 97.6  F (36.4  C) (Tympanic)   Resp 20   Ht 1.718 m (5' 7.62\")   Wt 87.4 kg (192 lb 9.6 oz)   LMP 03/25/2022 (Exact Date)   SpO2 98%   Breastfeeding No   BMI 29.62 kg/m    Physical Exam  GENERAL: healthy, alert and no distress  EYES: Eyes grossly normal to inspection, PERRL and conjunctivae and sclerae normal  HENT: ear canals and TM's normal, nose and mouth without ulcers or lesions  NECK: no adenopathy, no asymmetry, masses, or scars and thyroid normal to palpation  RESP: lungs clear to auscultation - no rales, rhonchi or wheezes  BREAST: normal without masses, tenderness or nipple discharge and no palpable axillary masses or adenopathy  CV: regular rate and rhythm, normal S1 S2, no S3 or S4, no murmur, click or rub, no peripheral edema and peripheral pulses strong  ABDOMEN: soft, nontender, no hepatosplenomegaly, no masses and bowel sounds normal  MS: no gross musculoskeletal defects noted, no edema  SKIN: no suspicious lesions or rashes  NEURO: Normal strength and tone, mentation intact and speech normal  PSYCH: mentation appears normal, affect normal/bright  Left index finger flexor tendon sheath cyst.  BACK: Lumbosacral " "spine area reveals local tenderness.  Painful and reduced LS ROM noted. Straight leg raise is negative.  DTR's, motor strength and sensation normal, including heel and toe gait.  Perifpheral pulses are palpable.  Hipes and knees have full range of motion without pain.  No abdominal tenderness, mass or organomegaly.    Diagnostic Test Results:  Labs reviewed in Epic    ASSESSMENT/PLAN:       ICD-10-CM    1. Routine general medical examination at a health care facility  Z00.00    2. Cervical cancer screening  Z12.4 Ob/Gyn Referral   3. Ganglion cyst of flexor tendon sheath of finger of left hand  M67.442 Orthopedic  Referral   4. Weight gain  R63.5 TSH with free T4 reflex     Basic metabolic panel  (Ca, Cl, CO2, Creat, Gluc, K, Na, BUN)     Comprehensive Weight Management   5. Dysthymia  F34.1 CBC with platelets and differential     escitalopram (LEXAPRO) 10 MG tablet   6. Acute left-sided low back pain with left-sided sciatica  M54.42 predniSONE (DELTASONE) 20 MG tablet   start lexapro and f/up in 1 mos  work on lifestyle modification      Patient has been advised of split billing requirements and indicates understanding: Yes    COUNSELING:  Reviewed preventive health counseling, as reflected in patient instructions       Regular exercise       Healthy diet/nutrition    Estimated body mass index is 29.62 kg/m  as calculated from the following:    Height as of this encounter: 1.718 m (5' 7.62\").    Weight as of this encounter: 87.4 kg (192 lb 9.6 oz).    Weight management plan: Discussed healthy diet and exercise guidelines    She reports that she has quit smoking. Her smoking use included cigarettes. She has never used smokeless tobacco.      Counseling Resources:  ATP IV Guidelines  Pooled Cohorts Equation Calculator  Breast Cancer Risk Calculator  BRCA-Related Cancer Risk Assessment: FHS-7 Tool  FRAX Risk Assessment  ICSI Preventive Guidelines  Dietary Guidelines for Americans, 2010  USDA's MyPlate  ASA " Prophylaxis  Lung CA Screening    BHAKTI Cruz [facility] [place]

## 2022-03-29 NOTE — TELEPHONE ENCOUNTER
DIAGNOSIS: Ganglion cyst of flexor tendon sheath of finger of left hand   APPOINTMENT DATE: 04/19/2022   NOTES STATUS DETAILS   OFFICE NOTE from referring provider Internal 03/28/2022 Dr Lemons [facility]   OFFICE NOTE from other specialist N/A    DISCHARGE SUMMARY from hospital N/A    DISCHARGE REPORT from the ER N/A    OPERATIVE REPORT N/A    MEDICATION LIST N/A    EMG (for Spine) N/A    IMPLANT RECORD/STICKER N/A    LABS     CBC/DIFF N/A    CULTURES N/A    INJECTIONS DONE IN RADIOLOGY N/A    MRI N/A    CT SCAN N/A    XRAYS (IMAGES & REPORTS) N/A    TUMOR     PATHOLOGY  Slides & report N/A

## 2022-04-11 ENCOUNTER — VIRTUAL VISIT (OUTPATIENT)
Dept: FAMILY MEDICINE | Facility: CLINIC | Age: 37
End: 2022-04-11
Payer: COMMERCIAL

## 2022-04-11 VITALS — WEIGHT: 192 LBS | HEIGHT: 67 IN | BODY MASS INDEX: 30.13 KG/M2

## 2022-04-11 DIAGNOSIS — E66.811 CLASS 1 OBESITY WITHOUT SERIOUS COMORBIDITY WITH BODY MASS INDEX (BMI) OF 30.0 TO 30.9 IN ADULT, UNSPECIFIED OBESITY TYPE: Primary | ICD-10-CM

## 2022-04-11 PROCEDURE — 99215 OFFICE O/P EST HI 40 MIN: CPT | Mod: GT | Performed by: FAMILY MEDICINE

## 2022-04-11 RX ORDER — TOPIRAMATE 25 MG/1
25 TABLET, FILM COATED ORAL
Qty: 60 TABLET | Refills: 0 | Status: SHIPPED | OUTPATIENT
Start: 2022-04-11 | End: 2022-05-02

## 2022-04-11 RX ORDER — PHENTERMINE HYDROCHLORIDE 37.5 MG/1
TABLET ORAL
Qty: 30 TABLET | Refills: 0 | Status: SHIPPED | OUTPATIENT
Start: 2022-04-11 | End: 2022-04-18

## 2022-04-11 NOTE — PROGRESS NOTES
Kaley is a 37 year old who is being evaluated via a billable video visit.      How would you like to obtain your AVS? MyChart  If the video visit is dropped, the invitation should be resent by: Text to cell phone: [phone]  Will anyone else be joining your video visit? No    Video Start Time: 8:03       Vitals:  No vitals were obtained today due to virtual visit.        Video-Visit Details    Type of service:  Video Visit    Video End Time:8:48    Originating Location (pt. Location): Home    Distant Location (provider location):  [facility]     Platform used for Video Visit: Opternative

## 2022-04-11 NOTE — ASSESSMENT & PLAN NOTE
We discussed obesity as a chronic disease and approach to managing this multifactorial issue.  The patient really does not have any genetic predisposition to obesity nor is she on any weight gaining medication.  She tracks her food on a regular basis and is quite knowledgeable as it relates to a healthy approach to eating and applies that routinely.  Despite doing this and exercising regularly she has been unable to lose weight and is struggling with some back and foot pain issues that she thinks is likely related to her excess weight.  We discussed a treatment approach and ultimately a prescription for phentermine and topiramate were given to ambulate the FDA approved medication Qsymia.  I asked the patient to follow-up in 1 month for weight loss follow-up visit and a blood pressure check.

## 2022-04-11 NOTE — PROGRESS NOTES
"    New Medical Weight Management Consult    PATIENT:  Kaley Mills  MRN:         [id number]  :         1985  AMY:         2022    Dear Dr. Keita,    I had the pleasure of seeing your patient, Kaley Mills. Full intake/assessment was done to determine barriers to weight loss success and develop a treatment plan. Kaley Mills is a 37 year old female interested in treatment of medical problems associated with excess weight. She has a height of 5' 7\", a weight of 192 lbs 0 oz, and the calculated Body mass index is 30.07 kg/m .    ASSESSMENT/PLAN:  Kaley is a patient with mature onset obesity without significant element of familial/genetic influence and with current health consequences. She does need aggressive weight loss plan due to inability to lose weight on her own and some health issues including foot and back pain.  Kaley Mills currently adheres to a healthy approach to eating and does exercise regularly.      PLAN:    Problem List Items Addressed This Visit        Digestive    Class 1 obesity without serious comorbidity with body mass index (BMI) of 30.0 to 30.9 in adult, unspecified obesity type - Primary     We discussed obesity as a chronic disease and approach to managing this multifactorial issue.  The patient really does not have any genetic predisposition to obesity nor is she on any weight gaining medication.  She tracks her food on a regular basis and is quite knowledgeable as it relates to a healthy approach to eating and applies that routinely.  Despite doing this and exercising regularly she has been unable to lose weight and is struggling with some back and foot pain issues that she thinks is likely related to her excess weight.  We discussed a treatment approach and ultimately a prescription for phentermine and topiramate were given to ambulate the FDA approved medication Qsymia.  I asked the patient to follow-up in 1 month for weight loss follow-up visit and a blood " "pressure check.           Relevant Medications    phentermine (ADIPEX-P) 37.5 MG tablet    topiramate (TOPAMAX) 25 MG tablet    Other Relevant Orders    Lipid Profile    Hemoglobin A1c            Orders Placed This Encounter   Procedures     Lipid Profile     Hemoglobin A1c       She has the following co-morbidities:       4/11/2022   I have the following health issues associated with obesity: None of the above   I have the following symptoms associated with obesity: Knee Pain, Depression, Back Pain       Patient Goals 4/11/2022   I am interested in having a healthier weight to diminish current health problems: Yes   I am interested in having a healthier weight in order to prevent future health problems: Yes   I am interested in having a healthier weight in order to have a future surgery: No       Referring Provider 4/11/2022   Please name the provider who referred you to Medical Weight Management.  If you do not know, please answer: \"I Don't Know\". Oliver garvey       Weight History 4/11/2022   How concerned are you about your weight? Very Concerned   Would you describe your weight gain as gradual? Yes   I became overweight: After Pregnancy   The following factors have contributed to my weight gain:  Mental Health Issues, Started on Medication that Caused Weight Gain, After Quitting Smoking   I have tried the following methods to lose weight: Watching Portions or Calories, Exercise, Slim Fast or Other Liquid Diets, Meal Replacements   My lowest weight since age 19 was: 157   My highest weight since age 19 was: 167   The most weight I have ever lost was: (lbs) 20   I have the following family history of obesity/being overweight:  I am the only one in my immediate family who is overweight   Has anyone in your family had weight loss surgery? No   How has your weight changed over the last year?  Gained   How many pounds? 20       Diet Recall Review with Patient 4/11/2022   Do you typically eat breakfast? Yes   Do " you typically eat lunch? Yes   Do you typically eat supper? Yes   Do you typically eat snacks? Yes   Do you like vegetables?  Yes   How many glasses of juice do you drink in a typical day? 0   How many of glasses of milk do you drink in a typical day? 0   If you do drink milk, what type? 2%   How many 8oz glasses of sugar containing drinks such as [employer]-A[employer]/sweet tea do you drink in a day? 0   How many cans/bottles of sugar pop/soda/tea/sports drinks do you drink in a day? 0   How many cans/bottles of diet pop/soda/tea or sports drink do you drink in a day? 2   How often do you have a drink of alcohol? Monthly or Less   If you do drink, how many drinks might you have in a day? 1 or 2       Eating Habits 4/11/2022   Generally, my meals include foods like these: bread, pasta, rice, potatoes, corn, crackers, sweet dessert, pop, or juice. Once a Week   Generally, my meals include foods like these: fried meats, brats, burgers, french fries, pizza, cheese, chips, or ice cream. Less Than Weekly   Eat fast food (like McDonalds, Burger Yovani, Taco Bell). Less Than Weekly   Eat at a buffet or sit-down restaurant. Less Than Weekly   Eat most of my meals in front of the TV or computer. Never   Often skip meals, eat at random times, have no regular eating times. Less Than Weekly   Rarely sit down for a meal but snack or graze throughout.  Never   Eat extra snacks between meals. A Few Times a Week   Eat most of my food at the end of the day. Once a Week   Eat in the middle of the night or wake up at night to eat. Never   Eat extra snacks to prevent or correct low blood sugar. Never   Eat to prevent acid reflux or stomach pain. Never   Worry about not having enough food to eat. Never   Have you been to the food shelf at least a few times this year? No   I eat when I am depressed. Less Than Weekly   I eat when I am stressed. Less Than Weekly   I eat when I am bored. A Few Times a Week   I eat when I am anxious. Never   I eat when I  am happy or as a reward. Less Than Weekly   I feel hungry all the time even if I just have eaten. Less Than Weekly   Feeling full is important to me. A Few Times a Week   I finish all the food on my plate even if I am already full. Never   I can't resist eating delicious food or walk past the good food/smell. Never   I eat/snack without noticing that I am eating. Never   I eat when I am preparing the meal. Once a Week   I eat more than usual when I see others eating. Less Than Weekly   I have trouble not eating sweets, ice cream, cookies, or chips if they are around the house. Never   I think about food all day. Less Than Weekly   What foods, if any, do you crave? Chips/Crackers       Amount of Food 4/11/2022   I make myself vomit what I have eaten or use laxatives to get rid of food. Never   I eat a large amount of food, like a loaf of bread, a box of cookies, a pint/quart of ice cream, all at once. Never   I eat a large amount of food even when I am not hungry. Never   I eat rapidly. Never   I eat alone because I feel embarrassed and do not want others to see how much I have eaten. Never   I eat until I am uncomfortably full. Never   I feel bad, disgusted, or guilty after I overeat. Never   I make myself vomit what I have eaten or use laxatives to get rid of food. Never       No flowsheet data found.    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 Less Than 50%   How many hours do you spend commuting to work daily?  1   What is your marital status? Single   If in a relationship, is your significant other overweight? No   Do you have children? Yes   If you have children, are they overweight? No   Who do you live with?  My child   Are they supportive of your health goals?  "Yes   Who does the food shopping?  Myself       Mental Health History Reviewed With Patient 4/11/2022   Have you ever been physically or sexually abused? No   If yes, do you feel that the abuse is affecting your weight? No   If yes, would you like to talk to a counselor about the abuse? No   How often in the past 2 weeks have you felt little interest or pleasure in doing things? For Several Days   Over the past 2 weeks how often have you felt down, depressed, or hopeless? For Several Days       Sleep History Reviewed With Patient 4/11/2022   How many hours do you sleep at night? 7   Do you think that you snore loudly or has anybody ever heard you snore loudly (louder than talking or so loud it can be heard behind a shut door)? No   Has anyone seen or heard you stop breathing during your sleep? No   Do you often feel tired, fatigued, or sleepy during the day? Yes   Do you have a TV/Computer in your bedroom? Yes       MEDICATIONS:   Current Outpatient Medications   Medication Sig Dispense Refill     EPINEPHrine 0.3 MG/0.3ML SOLN Inject 1 Units as directed. For allergic reaction. 3 each 1     escitalopram (LEXAPRO) 10 MG tablet Take 1/2 tab daily for one week, then increase to 1 full tab daily thereafter 40 tablet 0     ibuprofen (ADVIL/MOTRIN) 800 MG tablet Take 1 tablet (800 mg) by mouth every 8 hours as needed for moderate pain 60 tablet 0       ALLERGIES:   Allergies   Allergen Reactions     Bee Venom        PHYSICAL EXAM:  Ht 1.702 m (5' 7\")   Wt 87.1 kg (192 lb)   LMP 03/25/2022 (Exact Date)   BMI 30.07 kg/m      Waist circumference:      Wt Readings from Last 4 Encounters:   04/11/22 87.1 kg (192 lb)   03/28/22 87.4 kg (192 lb 9.6 oz)   06/06/19 90.7 kg (200 lb)   04/19/19 101.2 kg (223 lb)     Video visit: Unable to complete a physical exam although she did not appear in any distress, had good eye contact and fluent speech.    FOLLOW-UP:   4 weeks.    TIME: 45 min spent on evaluation, management, counseling, " education, & motivational interviewing with greater than 50 % of the total time was spent on counseling and coordinating care    Sincerely,    DONNA CHAVIRA

## 2022-04-16 ENCOUNTER — MYC MEDICAL ADVICE (OUTPATIENT)
Dept: FAMILY MEDICINE | Facility: CLINIC | Age: 37
End: 2022-04-16
Payer: COMMERCIAL

## 2022-04-16 DIAGNOSIS — E66.811 CLASS 1 OBESITY WITHOUT SERIOUS COMORBIDITY WITH BODY MASS INDEX (BMI) OF 30.0 TO 30.9 IN ADULT, UNSPECIFIED OBESITY TYPE: ICD-10-CM

## 2022-04-18 ENCOUNTER — TELEPHONE (OUTPATIENT)
Dept: SURGERY | Facility: CLINIC | Age: 37
End: 2022-04-18

## 2022-04-18 RX ORDER — PHENTERMINE HYDROCHLORIDE 37.5 MG/1
TABLET ORAL
Qty: 30 TABLET | Refills: 0 | Status: SHIPPED | OUTPATIENT
Start: 2022-04-18 | End: 2022-04-29

## 2022-04-18 NOTE — TELEPHONE ENCOUNTER
Attempts made to contact patient in regards to video visit that was scheduled for today with this writer at 3 pm.  Patient did not link onto video visit nor answer the phone with attempts made, therefore left patient a detailed message including call center contact information to call and reschedule this appointment at her earliest convenience.

## 2022-04-19 ENCOUNTER — PRE VISIT (OUTPATIENT)
Dept: ORTHOPEDICS | Facility: CLINIC | Age: 37
End: 2022-04-19

## 2022-04-27 DIAGNOSIS — F34.1 DYSTHYMIA: ICD-10-CM

## 2022-04-28 ENCOUNTER — MYC REFILL (OUTPATIENT)
Dept: FAMILY MEDICINE | Facility: CLINIC | Age: 37
End: 2022-04-28
Payer: COMMERCIAL

## 2022-04-28 DIAGNOSIS — F34.1 DYSTHYMIA: ICD-10-CM

## 2022-04-28 RX ORDER — ESCITALOPRAM OXALATE 10 MG/1
TABLET ORAL
Qty: 40 TABLET | Refills: 0 | Status: CANCELLED | OUTPATIENT
Start: 2022-04-28

## 2022-04-29 RX ORDER — ESCITALOPRAM OXALATE 10 MG/1
TABLET ORAL
Qty: 30 TABLET | Refills: 0 | Status: SHIPPED | OUTPATIENT
Start: 2022-04-29 | End: 2022-05-02

## 2022-04-29 NOTE — TELEPHONE ENCOUNTER
Medication is being filled for 1 time refill only due to:  Patient needs to be seen because need recheck.

## 2022-05-02 ENCOUNTER — MYC REFILL (OUTPATIENT)
Dept: FAMILY MEDICINE | Facility: CLINIC | Age: 37
End: 2022-05-02
Payer: COMMERCIAL

## 2022-05-02 DIAGNOSIS — E66.811 CLASS 1 OBESITY WITHOUT SERIOUS COMORBIDITY WITH BODY MASS INDEX (BMI) OF 30.0 TO 30.9 IN ADULT, UNSPECIFIED OBESITY TYPE: ICD-10-CM

## 2022-05-02 NOTE — TELEPHONE ENCOUNTER
Routing refill request to provider for review/approval because:  Pt wants script sent to different pharmacy, sig updated from taper to 1 tab daily.     Medication pended for approval, 30 day supply with reminder.

## 2022-05-03 RX ORDER — ESCITALOPRAM OXALATE 10 MG/1
10 TABLET ORAL DAILY
Qty: 30 TABLET | Refills: 0 | Status: SHIPPED | OUTPATIENT
Start: 2022-05-03 | End: 2022-05-20

## 2022-05-05 RX ORDER — PHENTERMINE HYDROCHLORIDE 37.5 MG/1
TABLET ORAL
Qty: 30 TABLET | Refills: 0 | OUTPATIENT
Start: 2022-05-05

## 2022-05-18 ENCOUNTER — OFFICE VISIT (OUTPATIENT)
Dept: FAMILY MEDICINE | Facility: CLINIC | Age: 37
End: 2022-05-18
Payer: COMMERCIAL

## 2022-05-18 VITALS
OXYGEN SATURATION: 97 % | HEART RATE: 77 BPM | SYSTOLIC BLOOD PRESSURE: 124 MMHG | WEIGHT: 177.8 LBS | BODY MASS INDEX: 27.91 KG/M2 | DIASTOLIC BLOOD PRESSURE: 72 MMHG | HEIGHT: 67 IN

## 2022-05-18 DIAGNOSIS — E66.811 CLASS 1 OBESITY WITHOUT SERIOUS COMORBIDITY WITH BODY MASS INDEX (BMI) OF 30.0 TO 30.9 IN ADULT, UNSPECIFIED OBESITY TYPE: ICD-10-CM

## 2022-05-18 LAB
CHOLEST SERPL-MCNC: 137 MG/DL
FASTING STATUS PATIENT QL REPORTED: NO
HDLC SERPL-MCNC: 45 MG/DL
LDLC SERPL CALC-MCNC: 84 MG/DL
TRIGL SERPL-MCNC: 41 MG/DL

## 2022-05-18 PROCEDURE — 80061 LIPID PANEL: CPT | Performed by: FAMILY MEDICINE

## 2022-05-18 PROCEDURE — 36415 COLL VENOUS BLD VENIPUNCTURE: CPT | Performed by: FAMILY MEDICINE

## 2022-05-18 PROCEDURE — 83036 HEMOGLOBIN GLYCOSYLATED A1C: CPT | Performed by: FAMILY MEDICINE

## 2022-05-18 PROCEDURE — 99213 OFFICE O/P EST LOW 20 MIN: CPT | Performed by: FAMILY MEDICINE

## 2022-05-18 ASSESSMENT — PATIENT HEALTH QUESTIONNAIRE - PHQ9
SUM OF ALL RESPONSES TO PHQ QUESTIONS 1-9: 0
SUM OF ALL RESPONSES TO PHQ QUESTIONS 1-9: 0
10. IF YOU CHECKED OFF ANY PROBLEMS, HOW DIFFICULT HAVE THESE PROBLEMS MADE IT FOR YOU TO DO YOUR WORK, TAKE CARE OF THINGS AT HOME, OR GET ALONG WITH OTHER PEOPLE: NOT DIFFICULT AT ALL

## 2022-05-18 NOTE — ASSESSMENT & PLAN NOTE
The patient was prescribed phentermine and has been tolerating it well taking once daily in the morning.  She has been taking topiramate 25 mg at dinner and does note a slight change in taste of certain foods.  She has lost nearly 8% of her weight in the first month of taking the medication.  The patient is expressing feeling hungry after just 4 hours and in further reviewing of her nutrition intake, I do not think she is getting in adequate amounts of protein or calories.  We discussed the critical importance of eating an adequate amount of the right types of foods at the right times in order to help manage appetite and also for sustainable weight loss longer-term.  I placed a referral for her to meet with our dietitian and provided her with handouts, reiterated the nutrition plan, and showed her how to log her foods on [employer].  I also talked to her about reviewing her daily logs in order to assess any adjustments she needs to make.  Follow-up in 1 month.

## 2022-05-18 NOTE — PROGRESS NOTES
Problem List Items Addressed This Visit        Digestive    Class 1 obesity without serious comorbidity with body mass index (BMI) of 30.0 to 30.9 in adult, unspecified obesity type     The patient was prescribed phentermine and has been tolerating it well taking once daily in the morning.  She has been taking topiramate 25 mg at dinner and does note a slight change in taste of certain foods.  She has lost nearly 8% of her weight in the first month of taking the medication.  The patient is expressing feeling hungry after just 4 hours and in further reviewing of her nutrition intake, I do not think she is getting in adequate amounts of protein or calories.  We discussed the critical importance of eating an adequate amount of the right types of foods at the right times in order to help manage appetite and also for sustainable weight loss longer-term.  I placed a referral for her to meet with our dietitian and provided her with handouts, reiterated the nutrition plan, and showed her how to log her foods on [employer].  I also talked to her about reviewing her daily logs in order to assess any adjustments she needs to make.  Follow-up in 1 month.               Answers for HPI/ROS submitted by the patient on 5/18/2022  If you checked off any problems, how difficult have these problems made it for you to do your work, take care of things at home, or get along with other people?: Not difficult at all  PHQ9 TOTAL SCORE: 0  What is the reason for your visit today? : Weight loss  How many servings of fruits and vegetables do you eat daily?: 2-3  How many minutes a day do you exercise enough to make your heart beat faster?: 20 to 29  How many days a week do you exercise enough to make your heart beat faster?: 5  How many days per week do you miss taking your medication?: 0      DONNA CHAVIRA    Birgit   Kaley is a 37 year old who presents today for weight loss follow-up.  The patient had a virtual weight loss intake  "visit about a month ago at which time she learned about obesity as a chronic disease and was provided an individualized treatment plan.  The patient was prescribed phentermine to take a half a tablet once daily in the morning.  However, she did contact me after a couple of weeks and asked if she could increase it to a full tablet as she felt that it was not effective enough.  She comes in today stating that she still feels like the phentermine only lasts about 4 hours and she is wondering if there is a long-acting form.  She has been taking topiramate at dinnertime but does find that it causes a slight change in the taste of some of the foods that she eats.  She has tolerated the medication well overall without any notable side effects.  She has been logging her activity as well as her nutrition for the most part.  However, she has not very aware of her daily average calories, protein or carbohydrate intake.  She had an appointment with a dietitian but states that it was canceled and she would like to reschedule that.  She is pleased with her weight loss this month and would like to continue on the medication.     Objective    /72 (BP Location: Right arm, Patient Position: Right side, Cuff Size: Adult Large)   Pulse 77   Ht 1.702 m (5' 7\")   Wt 80.6 kg (177 lb 12.8 oz)   LMP 03/25/2022 (Exact Date)   SpO2 97%   BMI 27.85 kg/m    Body mass index is 27.85 kg/m .  Physical Exam   GENERAL: healthy, alert and no distress            "

## 2022-05-19 LAB — HBA1C MFR BLD: 5.9 %

## 2022-05-31 ENCOUNTER — VIRTUAL VISIT (OUTPATIENT)
Dept: SURGERY | Facility: CLINIC | Age: 37
End: 2022-05-31
Payer: COMMERCIAL

## 2022-05-31 DIAGNOSIS — Z71.3 NUTRITIONAL COUNSELING: ICD-10-CM

## 2022-05-31 DIAGNOSIS — E66.3 OVERWEIGHT WITH BODY MASS INDEX (BMI) OF 27 TO 27.9 IN ADULT: Primary | ICD-10-CM

## 2022-05-31 PROCEDURE — 97802 MEDICAL NUTRITION INDIV IN: CPT | Mod: TEL | Performed by: DIETITIAN, REGISTERED

## 2022-05-31 NOTE — PROGRESS NOTES
Kaley Mills is a 37 year old who is being evaluated via a billable video visit.      How would you like to obtain your AVS? MyChart  If the video visit is dropped, the invitation should be resent by: Send to e-mail at: [email or web address]  Will anyone else be joining your video visit? No      Video Start Time: 8:58 AM (had to switch to phone due to not being able to connect video visit)       Medical Weight Loss Initial Diet Evaluation  Assessment:  Kaley is presenting today for a new weight management nutrition consultation. Pt has had an initial appointment with Dr. Laguna.  Weight loss medication: Phentermine. Topamax      Anthropometrics:    Pt's weight is 177 lbs   Initial weight: 192 lbs   Weight change: 15 lbs (down)   BMI: There is no height or weight on file to calculate BMI.   Ideal body weight: 61.6 kg (135 lb 12.9 oz)  Adjusted ideal body weight: 69.2 kg (152 lb 9.6 oz)    Estimated RMR (Ellis-St Jeor equation):    1528 kcals x 1.3 (light active) = 1986 kcals (for weight maintenance)    Recommended Protein Intake: 60-80 grams of protein/day    Medical History:  Patient Active Problem List   Diagnosis     Bee sting reaction     CARDIOVASCULAR SCREENING; LDL GOAL LESS THAN 160     LTBI (latent tuberculosis infection)     Smoker     Class 1 obesity without serious comorbidity with body mass index (BMI) of 30.0 to 30.9 in adult, unspecified obesity type      Diabetes: No -does have a family history of Diabetes (Father)   HbA1c:  No results found for: HGBA1C    Nutrition History:   Food allergies/intolerances/cultural or religous food customs: No     Vitamins/Mineral Supplementation: None     Dietary Recall:  Breakfast: eggs with hashbrowns or Protein Smoothie (Whey or Pea Protein), grapes   Lunch: sandwich (sub) or salad (with chicken and hard boiled eggs), small bag of chips    Dinner: (tends to eat later in the evening, which is hard to break)-leftovers or chips (Doritos)   Typical Snacks: Not  recently-Phentermine has been helping     Eating out: not recently-too expensive    Beverages: Diet Coke, Sparkling Water, Water (Infused), Coffee in the AM (light creamer or 1/2 and 1/2)    Exercise: Walking (at work) 4-7 miles, Roller Blading, Gardening/Weeding, [place]     Nutrition Diagnosis (PES statement):   Overweight/Obesity (NC 3.3) related to overeating and poor lifestyle habits as evidenced by patient report of excessive energy intake (especially in the evening) and BMI of 27.77 kg/m2.     Nutrition Intervention  1. Food and/or Nutrient Delivery   a. Placed emphasis on importance of developing a healthy meal routine, aiming for 3 meals a day and no snacks.  b. Discussed using a protein supplement as a meal replacement.  2. Nutrition Education   a. Discussed with patient how to build a meal: the importance of including a lean/low fat protein at each meal, include a source of vegetables at a minimum of lunch and dinner and limiting carbohydrate intake to <25% per meal.  b. Educated on sources of lean protein, portion sizes, the amount of grams found in each source. Recommend patient to aim for 20-30g protein at each meal.  c. Educated on how to read a food label: keeping total fat <10g and sugar <10g per serving.  d. Discussed the importance of adequate hydration, with emphasis on drinking 64oz of water or zero calorie beverages per day.  3. Nutrition Counseling   a. Encouraged importance of developing routine exercise for health benefits and weight loss.    Goals established by patient:   1. Focus on protein first, aiming for at least 20-30 grams of protein/meal, 3 meals/day.   2. Work on increased water consumption as well as decreasing Diet Coke consumption.   3. Work on reducing consumption of chips from her diet, use as an occasional treat vs routinely.   Handouts provided:  Lean Protein Sources     Assessment/Plan:    Pt will follow up in 1 month(s) with bariatrician and prn with dietitian.        Phone call duration: 30 minutes      Jodi Diaz, RD

## 2022-05-31 NOTE — LETTER
5/31/2022         RE: Kaley Mills  [address] [address]        Dear Colleague,    Thank you for referring your patient, Kaley Mills, to the [facility] [place]. Please see a copy of my visit note below.    Kaley Mills is a 37 year old who is being evaluated via a billable video visit.      How would you like to obtain your AVS? MyChart  If the video visit is dropped, the invitation should be resent by: Send to e-mail at: [email or web address]  Will anyone else be joining your video visit? No      Video Start Time: 8:58 AM (had to switch to phone due to not being able to connect video visit)       Medical Weight Loss Initial Diet Evaluation  Assessment:  Kaley is presenting today for a new weight management nutrition consultation. Pt has had an initial appointment with Dr. Laguna.  Weight loss medication: Phentermine. Topamax      Anthropometrics:    Pt's weight is 177 lbs   Initial weight: 192 lbs   Weight change: 15 lbs (down)   BMI: There is no height or weight on file to calculate BMI.   Ideal body weight: 61.6 kg (135 lb 12.9 oz)  Adjusted ideal body weight: 69.2 kg (152 lb 9.6 oz)    Estimated RMR (Towns-St Jeor equation):    1528 kcals x 1.3 (light active) = 1986 kcals (for weight maintenance)    Recommended Protein Intake: 60-80 grams of protein/day    Medical History:  Patient Active Problem List   Diagnosis     Bee sting reaction     CARDIOVASCULAR SCREENING; LDL GOAL LESS THAN 160     LTBI (latent tuberculosis infection)     Smoker     Class 1 obesity without serious comorbidity with body mass index (BMI) of 30.0 to 30.9 in adult, unspecified obesity type      Diabetes: No -does have a family history of Diabetes (Father)   HbA1c:  No results found for: HGBA1C    Nutrition History:   Food allergies/intolerances/cultural or religous food customs: No     Vitamins/Mineral Supplementation: None     Dietary Recall:  Breakfast: eggs with  hashbrowns or Protein Smoothie (Whey or Pea Protein), grapes   Lunch: sandwich (sub) or salad (with chicken and hard boiled eggs), small bag of chips    Dinner: (tends to eat later in the evening, which is hard to break)-leftovers or chips (Doritos)   Typical Snacks: Not recently-Phentermine has been helping     Eating out: not recently-too expensive    Beverages: Diet Coke, Sparkling Water, Water (Infused), Coffee in the AM (light creamer or 1/2 and 1/2)    Exercise: Walking (at work) 4-7 miles, Roller Blading, Gardening/Weeding, [place]     Nutrition Diagnosis (PES statement):   Overweight/Obesity (NC 3.3) related to overeating and poor lifestyle habits as evidenced by patient report of excessive energy intake (especially in the evening) and BMI of 27.77 kg/m2.     Nutrition Intervention  1. Food and/or Nutrient Delivery   a. Placed emphasis on importance of developing a healthy meal routine, aiming for 3 meals a day and no snacks.  b. Discussed using a protein supplement as a meal replacement.  2. Nutrition Education   a. Discussed with patient how to build a meal: the importance of including a lean/low fat protein at each meal, include a source of vegetables at a minimum of lunch and dinner and limiting carbohydrate intake to <25% per meal.  b. Educated on sources of lean protein, portion sizes, the amount of grams found in each source. Recommend patient to aim for 20-30g protein at each meal.  c. Educated on how to read a food label: keeping total fat <10g and sugar <10g per serving.  d. Discussed the importance of adequate hydration, with emphasis on drinking 64oz of water or zero calorie beverages per day.  3. Nutrition Counseling   a. Encouraged importance of developing routine exercise for health benefits and weight loss.    Goals established by patient:   1. Focus on protein first, aiming for at least 20-30 grams of protein/meal, 3 meals/day.   2. Work on increased water consumption as well as decreasing  Diet Coke consumption.   3. Work on reducing consumption of chips from her diet, use as an occasional treat vs routinely.   Handouts provided:  Lean Protein Sources     Assessment/Plan:    Pt will follow up in 1 month(s) with bariatrician and prn with dietitian.       Phone call duration: 30 minutes      Jodi Diaz RD        Again, thank you for allowing me to participate in the care of your patient.        Sincerely,        Jodi Diaz RD

## 2022-06-21 ENCOUNTER — MYC MEDICAL ADVICE (OUTPATIENT)
Dept: FAMILY MEDICINE | Facility: CLINIC | Age: 37
End: 2022-06-21

## 2022-06-21 DIAGNOSIS — E66.811 CLASS 1 OBESITY WITHOUT SERIOUS COMORBIDITY WITH BODY MASS INDEX (BMI) OF 30.0 TO 30.9 IN ADULT, UNSPECIFIED OBESITY TYPE: ICD-10-CM

## 2022-06-22 RX ORDER — PHENTERMINE HYDROCHLORIDE 37.5 MG/1
TABLET ORAL
Qty: 30 TABLET | Refills: 0 | Status: SHIPPED | OUTPATIENT
Start: 2022-06-22 | End: 2022-07-28

## 2022-06-23 DIAGNOSIS — E66.811 CLASS 1 OBESITY WITHOUT SERIOUS COMORBIDITY WITH BODY MASS INDEX (BMI) OF 30.0 TO 30.9 IN ADULT, UNSPECIFIED OBESITY TYPE: ICD-10-CM

## 2022-06-24 NOTE — TELEPHONE ENCOUNTER
"Routing refill request to provider for review/approval because:  Labs not current:  Multiple    Last Written Prescription Date:  5/2/22  Last Fill Quantity: 60,  # refills: 1   Last office visit provider:  5/18/22     Requested Prescriptions   Pending Prescriptions Disp Refills     topiramate (TOPAMAX) 25 MG tablet [Pharmacy Med Name: TOPIRAMATE 25 MG TABLET] 90 tablet 1     Sig: TAKE 1 TABLET (25 MG) BY MOUTH DAILY (WITH DINNER)       Anti-Seizure Meds Protocol  Failed - 6/24/2022  7:52 AM        Failed - Review Authorizing provider's last note.      Refer to last progress notes: confirm request is for original authorizing provider (cannot be through other providers).          Failed - Normal ALT or AST on file in past 26 months     No lab results found.  No lab results found.          Passed - Recent (12 mo) or future (30 days) visit within the authorizing provider's specialty     Patient has had an office visit with the authorizing provider or a provider within the authorizing providers department within the previous 12 mos or has a future within next 30 days. See \"Patient Info\" tab in inbasket, or \"Choose Columns\" in Meds & Orders section of the refill encounter.              Passed - Normal CBC on file in past 26 months     Recent Labs   Lab Test 03/28/22  1120   WBC 4.8   RBC 4.42   HGB 14.6   HCT 44.0                    Passed - Normal platelet count on file in past 26 months     Recent Labs   Lab Test 03/28/22  1120                  Passed - Medication is active on med list        Passed - No active pregnancy on record        Passed - No positive pregnancy test in last 12 months             Israel Connell RN 06/24/22 7:52 AM  "

## 2022-06-27 RX ORDER — TOPIRAMATE 25 MG/1
25 TABLET, FILM COATED ORAL
Qty: 90 TABLET | Refills: 1 | Status: SHIPPED | OUTPATIENT
Start: 2022-06-27 | End: 2022-07-28

## 2022-07-25 ENCOUNTER — MYC MEDICAL ADVICE (OUTPATIENT)
Dept: FAMILY MEDICINE | Facility: CLINIC | Age: 37
End: 2022-07-25

## 2022-07-28 ENCOUNTER — VIRTUAL VISIT (OUTPATIENT)
Dept: FAMILY MEDICINE | Facility: CLINIC | Age: 37
End: 2022-07-28
Attending: FAMILY MEDICINE
Payer: COMMERCIAL

## 2022-07-28 VITALS — BODY MASS INDEX: 27.78 KG/M2 | WEIGHT: 177 LBS | HEIGHT: 67 IN

## 2022-07-28 DIAGNOSIS — E66.811 CLASS 1 OBESITY WITHOUT SERIOUS COMORBIDITY WITH BODY MASS INDEX (BMI) OF 30.0 TO 30.9 IN ADULT, UNSPECIFIED OBESITY TYPE: Primary | ICD-10-CM

## 2022-07-28 DIAGNOSIS — F34.1 DYSTHYMIA: ICD-10-CM

## 2022-07-28 DIAGNOSIS — Z91.030 BEE ALLERGY STATUS: ICD-10-CM

## 2022-07-28 PROCEDURE — 99213 OFFICE O/P EST LOW 20 MIN: CPT | Mod: GT | Performed by: FAMILY MEDICINE

## 2022-07-28 RX ORDER — ESCITALOPRAM OXALATE 10 MG/1
10 TABLET ORAL DAILY
Qty: 90 TABLET | Refills: 1 | Status: SHIPPED | OUTPATIENT
Start: 2022-07-28 | End: 2022-12-16

## 2022-07-28 RX ORDER — PHENTERMINE HYDROCHLORIDE 37.5 MG/1
TABLET ORAL
Qty: 30 TABLET | Refills: 0 | Status: SHIPPED | OUTPATIENT
Start: 2022-07-28 | End: 2022-09-15

## 2022-07-28 RX ORDER — EPINEPHRINE 0.3 MG/.3ML
0.3 INJECTION SUBCUTANEOUS PRN
Qty: 4 EACH | Refills: 3 | Status: SHIPPED | OUTPATIENT
Start: 2022-07-28 | End: 2024-05-15

## 2022-07-28 RX ORDER — TOPIRAMATE 25 MG/1
25 TABLET, FILM COATED ORAL
Qty: 90 TABLET | Refills: 1 | Status: SHIPPED | OUTPATIENT
Start: 2022-07-28 | End: 2023-05-10

## 2022-07-28 RX ORDER — EPINEPHRINE 0.3 MG/.3ML
0.3 INJECTION SUBCUTANEOUS PRN
COMMUNITY
End: 2022-07-28

## 2022-07-28 NOTE — ASSESSMENT & PLAN NOTE
The patient lost about 15 pounds initially and has since hit a plateau.  She is tolerating the phentermine and topiramate well without any side effects and definitively feels that the medication is helping with cravings and appetite.  She did meet with our dietitian and has been working to incorporate more fruits and vegetables and healthy low carbohydrate recipes into her diet.  She says that she could still do better as it relates to the quality of her nutrition but continues to work on that.  She has been exercising 3 days a week.  We are can continue her 2 medications at this time with follow-up in 3 months.  We did discuss the importance of monitoring her blood pressure while on phentermine and she has a home blood pressure machine and will check at home.

## 2022-07-28 NOTE — PROGRESS NOTES
Kaley is a 37 year old who is being evaluated via a billable video visit.      How would you like to obtain your AVS? MyChart  If the video visit is dropped, the invitation should be resent by: Text to cell phone: [phone]   Will anyone else be joining your video visit? No      Video-Visit Details    Video Start Time: 8:51    Type of service:  Video Visit    Video End Time: 9:01    Originating Location (pt. Location): home    Distant Location (provider location):  [facility]     Platform used for Video Visit: [employer]    Problem List Items Addressed This Visit     Class 1 obesity without serious comorbidity with body mass index (BMI) of 30.0 to 30.9 in adult, unspecified obesity type      Other Visit Diagnoses     Dysthymia             Follow up in 3 months.     Subjective   Kaley is a 37 year old who presents today for a weight loss follow-up visit virtually.  The patient was seen for weight loss intake visit in April at which time her BMI was just over 30 and she was interested in losing weight.  She had struggled to lose weight on her own and we talked about approach to nutrition and exercise for weight management as well as medications.  The patient was interested in medication and ultimately a prescription for phentermine and topiramate was provided.  Initially she was prescribed a half of a pill dosage but did not feel that was helpful and was increased to 37.5 mg tablet.  She did meet with our dietitian recently and found that to be helpful.  She has been tried incorporate more low carbohydrate recipe ideas into her diet as well as eating more healthy fats such as nuts as snacks when she gets hungry.  She feels that overall phentermine and topiramate have been really helping her to manage her appetite and cravings.  Last week was a stressful week workwise and she really feels she needs to work more on structuring her eating habits to be more successful.  She initially lost about  15 pounds but has not lost additional weight since May.  She has been exercising 3 days a week.  The patient currently is not in a sexual relationship and so there is no risk of pregnancy and she does not anticipate that changing anytime soon.        Chief Complaint   Patient presents with     Weight Loss         Objective           Vitals:  No vitals were obtained today due to virtual visit.    Physical Exam   GENERAL: healthy, alert and no distress        This note has been dictated using voice recognition software. Any grammatical or context distortions are unintentional and inherent to the software

## 2022-09-15 DIAGNOSIS — E66.811 CLASS 1 OBESITY WITHOUT SERIOUS COMORBIDITY WITH BODY MASS INDEX (BMI) OF 30.0 TO 30.9 IN ADULT, UNSPECIFIED OBESITY TYPE: ICD-10-CM

## 2022-09-15 RX ORDER — PHENTERMINE HYDROCHLORIDE 37.5 MG/1
TABLET ORAL
Qty: 30 TABLET | Refills: 0 | Status: SHIPPED | OUTPATIENT
Start: 2022-09-15 | End: 2022-11-01

## 2022-09-15 NOTE — TELEPHONE ENCOUNTER
This patient will be due for a follow-up visit towards the end of next month.  Please reach out to assist in scheduling a follow-up weight loss appointment.

## 2022-09-15 NOTE — TELEPHONE ENCOUNTER
Left message to call back for: Patient  Information to relay to patient: See provider message below and help patient schedule.

## 2022-10-15 ENCOUNTER — HEALTH MAINTENANCE LETTER (OUTPATIENT)
Age: 37
End: 2022-10-15

## 2022-11-01 DIAGNOSIS — E66.811 CLASS 1 OBESITY WITHOUT SERIOUS COMORBIDITY WITH BODY MASS INDEX (BMI) OF 30.0 TO 30.9 IN ADULT, UNSPECIFIED OBESITY TYPE: ICD-10-CM

## 2022-11-01 RX ORDER — PHENTERMINE HYDROCHLORIDE 37.5 MG/1
TABLET ORAL
Qty: 30 TABLET | Refills: 0 | Status: SHIPPED | OUTPATIENT
Start: 2022-11-01 | End: 2023-02-02

## 2022-11-29 ENCOUNTER — ALLIED HEALTH/NURSE VISIT (OUTPATIENT)
Dept: FAMILY MEDICINE | Facility: CLINIC | Age: 37
End: 2022-11-29
Payer: COMMERCIAL

## 2022-11-29 DIAGNOSIS — Z01.84 IMMUNITY STATUS TESTING: Primary | ICD-10-CM

## 2022-11-29 DIAGNOSIS — Z11.1 SCREENING EXAMINATION FOR PULMONARY TUBERCULOSIS: ICD-10-CM

## 2022-11-29 PROCEDURE — 36415 COLL VENOUS BLD VENIPUNCTURE: CPT

## 2022-11-29 PROCEDURE — 99207 PR NO CHARGE NURSE ONLY: CPT

## 2022-11-29 PROCEDURE — 86481 TB AG RESPONSE T-CELL SUSP: CPT

## 2022-11-29 PROCEDURE — 86787 VARICELLA-ZOSTER ANTIBODY: CPT

## 2022-11-29 NOTE — PROGRESS NOTES
Patient ended up not needing Hep B and Varicella vaccine. Patient has immunity and had 3 Hep B vaccines prior to today. Patient states she had chicken pox when she was younger. Johanna Joaquin ordered a varicella titer blood draw and a TB gold blood draw. Patient needed all this updated information for a new job.     Anuja Tran RN on 11/29/2022 at 11:20 AM

## 2022-11-29 NOTE — LETTER
November 29, 2022      Kaley Mills  [address] [address]        To Whom It May Concern,      Kaley was seen in clinic today. We are doing immunity status testing for varicella. If she is not immune, we will assist her to get the varicella vaccine. Similarly, we are doing TB Gold for tuberculosis screening. Both tests can take up to a few days to result.          Sincerely,      MICHAEL Alexandra CNP

## 2022-11-30 LAB
VZV IGG SER QL IA: 1724 INDEX
VZV IGG SER QL IA: POSITIVE

## 2022-12-01 LAB
GAMMA INTERFERON BACKGROUND BLD IA-ACNC: 0.06 IU/ML
M TB IFN-G BLD-IMP: NEGATIVE
M TB IFN-G CD4+ BCKGRND COR BLD-ACNC: 9.94 IU/ML
MITOGEN IGNF BCKGRD COR BLD-ACNC: 0 IU/ML
MITOGEN IGNF BCKGRD COR BLD-ACNC: 0.01 IU/ML
QUANTIFERON MITOGEN: 10 IU/ML
QUANTIFERON NIL TUBE: 0.06 IU/ML
QUANTIFERON TB1 TUBE: 0.06 IU/ML
QUANTIFERON TB2 TUBE: 0.07

## 2022-12-16 DIAGNOSIS — E66.811 CLASS 1 OBESITY WITHOUT SERIOUS COMORBIDITY WITH BODY MASS INDEX (BMI) OF 30.0 TO 30.9 IN ADULT, UNSPECIFIED OBESITY TYPE: ICD-10-CM

## 2022-12-16 DIAGNOSIS — F34.1 DYSTHYMIA: ICD-10-CM

## 2022-12-16 RX ORDER — PHENTERMINE HYDROCHLORIDE 37.5 MG/1
TABLET ORAL
Qty: 30 TABLET | Refills: 0 | OUTPATIENT
Start: 2022-12-16

## 2022-12-16 RX ORDER — ESCITALOPRAM OXALATE 10 MG/1
10 TABLET ORAL DAILY
Qty: 90 TABLET | Refills: 1 | Status: SHIPPED | OUTPATIENT
Start: 2022-12-16 | End: 2023-05-10

## 2023-04-20 ENCOUNTER — PATIENT OUTREACH (OUTPATIENT)
Dept: CARE COORDINATION | Facility: CLINIC | Age: 38
End: 2023-04-20
Payer: MEDICAID

## 2023-05-10 ENCOUNTER — OFFICE VISIT (OUTPATIENT)
Dept: FAMILY MEDICINE | Facility: CLINIC | Age: 38
End: 2023-05-10
Payer: COMMERCIAL

## 2023-05-10 VITALS
SYSTOLIC BLOOD PRESSURE: 134 MMHG | OXYGEN SATURATION: 96 % | DIASTOLIC BLOOD PRESSURE: 87 MMHG | WEIGHT: 222.8 LBS | BODY MASS INDEX: 34.97 KG/M2 | HEIGHT: 67 IN | HEART RATE: 101 BPM

## 2023-05-10 DIAGNOSIS — E66.09 CLASS 1 OBESITY DUE TO EXCESS CALORIES WITHOUT SERIOUS COMORBIDITY WITH BODY MASS INDEX (BMI) OF 34.0 TO 34.9 IN ADULT: Primary | ICD-10-CM

## 2023-05-10 DIAGNOSIS — R73.03 PREDIABETES: ICD-10-CM

## 2023-05-10 DIAGNOSIS — E66.811 CLASS 1 OBESITY DUE TO EXCESS CALORIES WITHOUT SERIOUS COMORBIDITY WITH BODY MASS INDEX (BMI) OF 34.0 TO 34.9 IN ADULT: Primary | ICD-10-CM

## 2023-05-10 DIAGNOSIS — E66.811 CLASS 1 OBESITY WITHOUT SERIOUS COMORBIDITY WITH BODY MASS INDEX (BMI) OF 30.0 TO 30.9 IN ADULT, UNSPECIFIED OBESITY TYPE: ICD-10-CM

## 2023-05-10 LAB — HBA1C MFR BLD: 5.9 % (ref 0–5.6)

## 2023-05-10 PROCEDURE — 36415 COLL VENOUS BLD VENIPUNCTURE: CPT | Performed by: FAMILY MEDICINE

## 2023-05-10 PROCEDURE — 83036 HEMOGLOBIN GLYCOSYLATED A1C: CPT | Performed by: FAMILY MEDICINE

## 2023-05-10 PROCEDURE — 99214 OFFICE O/P EST MOD 30 MIN: CPT | Performed by: FAMILY MEDICINE

## 2023-05-10 ASSESSMENT — PATIENT HEALTH QUESTIONNAIRE - PHQ9
SUM OF ALL RESPONSES TO PHQ QUESTIONS 1-9: 0
10. IF YOU CHECKED OFF ANY PROBLEMS, HOW DIFFICULT HAVE THESE PROBLEMS MADE IT FOR YOU TO DO YOUR WORK, TAKE CARE OF THINGS AT HOME, OR GET ALONG WITH OTHER PEOPLE: NOT DIFFICULT AT ALL
SUM OF ALL RESPONSES TO PHQ QUESTIONS 1-9: 0

## 2023-05-11 ENCOUNTER — TELEPHONE (OUTPATIENT)
Dept: FAMILY MEDICINE | Facility: CLINIC | Age: 38
End: 2023-05-11
Payer: MEDICAID

## 2023-05-11 NOTE — TELEPHONE ENCOUNTER
Prior Authorization Request  Who s requesting:  Pharmacy  Pharmacy Name and Location: [facility] [id number] [place], [place]  Medication Name: Semaglutide-Weight Management (WEGOVY) 0.25 MG/0.5ML pen  Insurance Plan: [employer] [place]  Insurance Member ID Number:  [id number]  CoverMyMeds Key: NA  Informed patient that prior authorizations can take up to 10 business days for response:   NA  Okay to leave a detailed message: No     Route to pool:  GUSTABO ORTIZ MED

## 2023-05-14 NOTE — PROGRESS NOTES
Problem List Items Addressed This Visit        Digestive    Class 1 obesity without serious comorbidity with body mass index (BMI) of 30.0 to 30.9 in adult, unspecified obesity type     The patient was seen for a medical weight management consultation in April 2022.  At that time, she was prescribed a combination of phentermine and topiramate and had successful weight loss initially.  However, she then had some significant stress and job change and she stopped taking medication and did not focus on weight management.  This resulted in weight regain plus gaining a little bit more.  We discussed getting back on track and the patient feels she has much better capacity right now focus on nutrition and exercise and has already started on that path.  However, is not finding good results on her own.  We discussed medication and ultimately a prescription for Wegovy was provided today.         Relevant Medications    Semaglutide-Weight Management (WEGOVY) 0.25 MG/0.5ML pen   Other Visit Diagnoses     Class 1 obesity due to excess calories without serious comorbidity with body mass index (BMI) of 34.0 to 34.9 in adult    -  Primary    Relevant Medications    Semaglutide-Weight Management (WEGOVY) 0.25 MG/0.5ML pen    Prediabetes        Relevant Orders    Hemoglobin A1c (Completed)          DONNA CHAVIRA MD    Birgit Roche is a 38 year old who presents today to discuss weight loss.  The patient had an initial medical weight management consultation last April virtually.  She was prescribed a combination of phentermine and.  To help has been lost.  However, then she had a significant job change and some additional stressors and really struggled to stay on track and stopped taking the medication as well.  She regained all of her weight loss a little more.  She is now more settled and is interested in getting refocused.  She is getting back into exercise on a regular basis and is feeling much better.  However, she is  "not seeing the weight loss she was hoping for and feels that getting back on a medication would be most helpful.     Objective    /87 (BP Location: Right arm, Patient Position: Right side, Cuff Size: Adult Large)   Pulse 101   Ht 1.702 m (5' 7\")   Wt 101.1 kg (222 lb 12.8 oz)   SpO2 96%   BMI 34.90 kg/m    Body mass index is 34.9 kg/m .  Physical Exam   GENERAL: healthy, alert and no distress            Answers for HPI/ROS submitted by the patient on 5/10/2023  If you checked off any problems, how difficult have these problems made it for you to do your work, take care of things at home, or get along with other people?: Not difficult at all  PHQ9 TOTAL SCORE: 0  Your back pain is: recurring  Where is your back pain located? : right lower back, left lower back  How would you describe your back pain? : dull ache  Where does your back pain spread? : right buttocks, left buttocks  Since you noticed your back pain, how has it changed? : always present, but gets better and worse  Does your back pain interfere with your job?: No  How many servings of fruits and vegetables do you eat daily?: 2-3  On average, how many sweetened beverages do you drink each day (Examples: soda, juice, sweet tea, etc.  Do NOT count diet or artificially sweetened beverages)?: 2  How many minutes a day do you exercise enough to make your heart beat faster?: 10 to 19  How many days per week do you miss taking your medication?: 0      "

## 2023-05-14 NOTE — ASSESSMENT & PLAN NOTE
The patient was seen for a medical weight management consultation in April 2022.  At that time, she was prescribed a combination of phentermine and topiramate and had successful weight loss initially.  However, she then had some significant stress and job change and she stopped taking medication and did not focus on weight management.  This resulted in weight regain plus gaining a little bit more.  We discussed getting back on track and the patient feels she has much better capacity right now focus on nutrition and exercise and has already started on that path.  However, is not finding good results on her own.  We discussed medication and ultimately a prescription for Wegovy was provided today.

## 2023-05-14 NOTE — TELEPHONE ENCOUNTER
Prior Authorization Not Needed per Insurance    Medication: Semaglutide-Weight Management (WEGOVY) 0.25 MG/0.5ML pen - [place] Not Needed  Insurance Company: [employer] - Phone [phone] Fax [phone]  Expected CoPay:      Pharmacy Filling the Rx: [id number] [place] [address]  Pharmacy Notified:  Yes          Thank you,     Bruce Barriga, [facility]acy [facility]   [email or web address]   Phone: [phone]  Fax: [phone]

## 2023-05-27 ENCOUNTER — HEALTH MAINTENANCE LETTER (OUTPATIENT)
Age: 38
End: 2023-05-27

## 2023-05-31 ENCOUNTER — MYC REFILL (OUTPATIENT)
Dept: FAMILY MEDICINE | Facility: CLINIC | Age: 38
End: 2023-05-31
Payer: MEDICAID

## 2023-05-31 DIAGNOSIS — E66.811 CLASS 1 OBESITY DUE TO EXCESS CALORIES WITHOUT SERIOUS COMORBIDITY WITH BODY MASS INDEX (BMI) OF 34.0 TO 34.9 IN ADULT: ICD-10-CM

## 2023-05-31 DIAGNOSIS — E66.09 CLASS 1 OBESITY DUE TO EXCESS CALORIES WITHOUT SERIOUS COMORBIDITY WITH BODY MASS INDEX (BMI) OF 34.0 TO 34.9 IN ADULT: ICD-10-CM

## 2023-06-16 DIAGNOSIS — E66.811 CLASS 1 OBESITY DUE TO EXCESS CALORIES WITHOUT SERIOUS COMORBIDITY WITH BODY MASS INDEX (BMI) OF 34.0 TO 34.9 IN ADULT: ICD-10-CM

## 2023-06-16 DIAGNOSIS — E66.09 CLASS 1 OBESITY DUE TO EXCESS CALORIES WITHOUT SERIOUS COMORBIDITY WITH BODY MASS INDEX (BMI) OF 34.0 TO 34.9 IN ADULT: ICD-10-CM

## 2023-06-18 RX ORDER — SEMAGLUTIDE 0.5 MG/.5ML
INJECTION, SOLUTION SUBCUTANEOUS
Qty: 2 ML | Refills: 0 | Status: SHIPPED | OUTPATIENT
Start: 2023-06-18 | End: 2023-07-14 | Stop reason: DRUGHIGH

## 2023-06-27 DIAGNOSIS — E66.09 CLASS 1 OBESITY DUE TO EXCESS CALORIES WITHOUT SERIOUS COMORBIDITY WITH BODY MASS INDEX (BMI) OF 34.0 TO 34.9 IN ADULT: Primary | ICD-10-CM

## 2023-06-27 DIAGNOSIS — E66.811 CLASS 1 OBESITY DUE TO EXCESS CALORIES WITHOUT SERIOUS COMORBIDITY WITH BODY MASS INDEX (BMI) OF 34.0 TO 34.9 IN ADULT: Primary | ICD-10-CM

## 2023-06-27 NOTE — TELEPHONE ENCOUNTER
General Call    Contacts       Type Contact Phone/Fax    06/27/2023 03:19 PM CDT Phone (Incoming) Kaley Mills (Self) [phone] (M)        Reason for Call:  Medication Request    What are your questions or concerns:  Patient is requesting prescription for Wegovy 1mg be sent to pharmacy. A refill for the 0.5mg dose was sent 6/18. She is requesting that prescription be cancelled and the increased dose be sent.     Date of last appointment with provider: 5/10/2023    Could we send this information to you in [employer] or would you prefer to receive a phone call?:   Patient would prefer a phone call   Okay to leave a detailed message?: Yes at Cell number on file:    Telephone Information:   Mobile [phone]

## 2023-07-14 ENCOUNTER — MYC MEDICAL ADVICE (OUTPATIENT)
Dept: FAMILY MEDICINE | Facility: CLINIC | Age: 38
End: 2023-07-14
Payer: MEDICAID

## 2023-07-14 DIAGNOSIS — E66.09 CLASS 1 OBESITY DUE TO EXCESS CALORIES WITHOUT SERIOUS COMORBIDITY WITH BODY MASS INDEX (BMI) OF 34.0 TO 34.9 IN ADULT: Primary | ICD-10-CM

## 2023-07-14 DIAGNOSIS — E66.811 CLASS 1 OBESITY DUE TO EXCESS CALORIES WITHOUT SERIOUS COMORBIDITY WITH BODY MASS INDEX (BMI) OF 34.0 TO 34.9 IN ADULT: Primary | ICD-10-CM

## 2023-08-08 ENCOUNTER — MYC MEDICAL ADVICE (OUTPATIENT)
Dept: FAMILY MEDICINE | Facility: CLINIC | Age: 38
End: 2023-08-08
Payer: MEDICAID

## 2023-08-08 DIAGNOSIS — E66.811 CLASS 1 OBESITY WITHOUT SERIOUS COMORBIDITY WITH BODY MASS INDEX (BMI) OF 30.0 TO 30.9 IN ADULT, UNSPECIFIED OBESITY TYPE: Primary | ICD-10-CM

## 2023-08-10 DIAGNOSIS — E66.811 CLASS 1 OBESITY WITHOUT SERIOUS COMORBIDITY WITH BODY MASS INDEX (BMI) OF 30.0 TO 30.9 IN ADULT, UNSPECIFIED OBESITY TYPE: Primary | ICD-10-CM

## 2023-08-10 NOTE — TELEPHONE ENCOUNTER
Last month Kaley was on the 1.7MG dose and she states that she is ready for the next one up.    Patient states that she sometimes gets a little nauseated depending on what she eats but its very tolerable, and there is no other notable side affects.    Patient is due for her next injection today and is requesting her fill asap.   Please advise.

## 2023-08-10 NOTE — TELEPHONE ENCOUNTER
Medication Question or Refill        What medication are you calling about (include dose and sig)?: Wegovy     Preferred Pharmacy:       [facility] [place] - [address]  [address] [address] [facility] [address]  Phone: [phone] Fax: [phone]      Controlled Substance Agreement on file:   CSA -- Patient Level:    CSA: None found at the patient level.       Who prescribed the medication?: Dr Laguna    Do you need a refill? Yes    When did you use the medication last? Last week    Patient offered an appointment? No    Do you have any questions or concerns?  Yes: patient thinks she may have her old dose at the pharmacy instead of her new dose      Could we send this information to you in [facility] or would you prefer to receive a phone call?:   No preference   Okay to leave a detailed message?: Yes at Home number on file [phone] (home)

## 2023-08-10 NOTE — TELEPHONE ENCOUNTER
Left message to call back for: Merari  Information to relay to patient: Please have merari verify that it is the  2.4 mg dose of Wegovy she desires to have.  Please ask about any side effects.

## 2023-10-09 ENCOUNTER — OFFICE VISIT (OUTPATIENT)
Dept: FAMILY MEDICINE | Facility: CLINIC | Age: 38
End: 2023-10-09
Payer: COMMERCIAL

## 2023-10-09 VITALS
RESPIRATION RATE: 16 BRPM | BODY MASS INDEX: 29.52 KG/M2 | DIASTOLIC BLOOD PRESSURE: 72 MMHG | OXYGEN SATURATION: 96 % | HEART RATE: 102 BPM | SYSTOLIC BLOOD PRESSURE: 101 MMHG | WEIGHT: 188.1 LBS | HEIGHT: 67 IN | TEMPERATURE: 98.4 F

## 2023-10-09 DIAGNOSIS — Z11.1 SCREENING FOR TUBERCULOSIS: Primary | ICD-10-CM

## 2023-10-09 PROBLEM — Z12.4 CERVICAL CANCER SCREENING: Status: ACTIVE | Noted: 2023-10-09

## 2023-10-09 PROCEDURE — 99213 OFFICE O/P EST LOW 20 MIN: CPT | Mod: 25 | Performed by: FAMILY MEDICINE

## 2023-10-09 PROCEDURE — 86481 TB AG RESPONSE T-CELL SUSP: CPT | Performed by: FAMILY MEDICINE

## 2023-10-09 PROCEDURE — 36415 COLL VENOUS BLD VENIPUNCTURE: CPT | Performed by: FAMILY MEDICINE

## 2023-10-09 PROCEDURE — 90686 IIV4 VACC NO PRSV 0.5 ML IM: CPT | Performed by: FAMILY MEDICINE

## 2023-10-09 PROCEDURE — 90471 IMMUNIZATION ADMIN: CPT | Performed by: FAMILY MEDICINE

## 2023-10-09 PROCEDURE — 91320 SARSCV2 VAC 30MCG TRS-SUC IM: CPT | Performed by: FAMILY MEDICINE

## 2023-10-09 PROCEDURE — 90480 ADMN SARSCOV2 VAC 1/ONLY CMP: CPT | Performed by: FAMILY MEDICINE

## 2023-10-09 RX ORDER — IBUPROFEN 800 MG/1
800 TABLET, FILM COATED ORAL EVERY 6 HOURS PRN
COMMUNITY
Start: 2023-09-10 | End: 2024-01-02

## 2023-10-09 NOTE — PROGRESS NOTES
"  Assessment & Plan   Problem List Items Addressed This Visit    None  Visit Diagnoses       Screening for tuberculosis    -  Primary    TB test ordered per employment requirements.    Relevant Orders    Quantiferon TB Gold Plus              BMI:   Estimated body mass index is 29.46 kg/m  as calculated from the following:    Height as of this encounter: 1.702 m (5' 7\").    Weight as of this encounter: 85.3 kg (188 lb 1.6 oz).   Weight management plan: Discussed healthy diet and exercise guidelines    Follow-up with PCP for annual physical.    [facility] EV Roche is a 38 year old, presenting for the following health issues:  screen for tb      10/9/2023    12:40 PM   Additional Questions   Roomed by ac   Accompanied by self       Patient presents today to discuss TB screening options.  Also needs updated COVID and flu vaccine for upcoming employment.  Needs TB test for the same.  Of note back in her early 20s patient states she had a skin test that was \"falsely\" read as positive and she did go through latent TB treatment.  She has had TB Gold test since then and they have all been negative.  She preferred to have the TB Gold test instead of the PPD test secondary to past experience.    History of Present Illness       Reason for visit:  Flu shot and tb titer    She eats 2-3 servings of fruits and vegetables daily.She consumes 2 sweetened beverage(s) daily.She exercises with enough effort to increase her heart rate 30 to 60 minutes per day.  She exercises with enough effort to increase her heart rate 6 days per week.   She is taking medications regularly.                 Review of Systems   All other systems reviewed and are negative.           Objective    /72 (BP Location: Left arm, Patient Position: Sitting, Cuff Size: Adult Large)   Pulse 102   Temp 98.4  F (36.9  C) (Oral)   Resp 16   Ht 1.702 m (5' 7\")   Wt 85.3 kg (188 lb 1.6 oz)   LMP " 09/26/2023 (Approximate)   SpO2 96%   BMI 29.46 kg/m    Body mass index is 29.46 kg/m .  Physical Exam  Vitals and nursing note reviewed.   Constitutional:       General: She is not in acute distress.     Appearance: Normal appearance. She is not ill-appearing.   HENT:      Head: Normocephalic and atraumatic.   Eyes:      Extraocular Movements: Extraocular movements intact.      Conjunctiva/sclera: Conjunctivae normal.   Pulmonary:      Effort: Pulmonary effort is normal.   Neurological:      Mental Status: She is alert and oriented to person, place, and time.   Psychiatric:         Attention and Perception: Attention normal.         Mood and Affect: Mood normal.         Speech: Speech normal.         Thought Content: Thought content normal.

## 2023-10-11 LAB
GAMMA INTERFERON BACKGROUND BLD IA-ACNC: 0 IU/ML
M TB IFN-G BLD-IMP: NEGATIVE
M TB IFN-G CD4+ BCKGRND COR BLD-ACNC: 10 IU/ML
MITOGEN IGNF BCKGRD COR BLD-ACNC: 0 IU/ML
MITOGEN IGNF BCKGRD COR BLD-ACNC: 0.01 IU/ML
QUANTIFERON MITOGEN: 10 IU/ML
QUANTIFERON NIL TUBE: 0 IU/ML
QUANTIFERON TB1 TUBE: 0.01 IU/ML
QUANTIFERON TB2 TUBE: 0

## 2023-12-05 DIAGNOSIS — E66.811 CLASS 1 OBESITY WITHOUT SERIOUS COMORBIDITY WITH BODY MASS INDEX (BMI) OF 30.0 TO 30.9 IN ADULT, UNSPECIFIED OBESITY TYPE: ICD-10-CM

## 2023-12-27 ENCOUNTER — MYC MEDICAL ADVICE (OUTPATIENT)
Dept: FAMILY MEDICINE | Facility: CLINIC | Age: 38
End: 2023-12-27
Payer: COMMERCIAL

## 2023-12-27 DIAGNOSIS — E66.811 CLASS 1 OBESITY WITHOUT SERIOUS COMORBIDITY WITH BODY MASS INDEX (BMI) OF 30.0 TO 30.9 IN ADULT, UNSPECIFIED OBESITY TYPE: ICD-10-CM

## 2024-01-02 ENCOUNTER — OFFICE VISIT (OUTPATIENT)
Dept: FAMILY MEDICINE | Facility: CLINIC | Age: 39
End: 2024-01-02
Payer: COMMERCIAL

## 2024-01-02 VITALS
RESPIRATION RATE: 12 BRPM | OXYGEN SATURATION: 99 % | BODY MASS INDEX: 26.7 KG/M2 | SYSTOLIC BLOOD PRESSURE: 116 MMHG | TEMPERATURE: 97.9 F | WEIGHT: 176.2 LBS | HEIGHT: 68 IN | DIASTOLIC BLOOD PRESSURE: 76 MMHG | HEART RATE: 100 BPM

## 2024-01-02 DIAGNOSIS — M25.842 CYST OF JOINT OF LEFT HAND: ICD-10-CM

## 2024-01-02 DIAGNOSIS — Z01.818 PREOP GENERAL PHYSICAL EXAM: Primary | ICD-10-CM

## 2024-01-02 PROCEDURE — 99214 OFFICE O/P EST MOD 30 MIN: CPT | Performed by: PHYSICIAN ASSISTANT

## 2024-01-02 RX ORDER — IBUPROFEN 800 MG/1
800 TABLET, FILM COATED ORAL EVERY 6 HOURS PRN
Qty: 60 TABLET | Refills: 1 | Status: SHIPPED | OUTPATIENT
Start: 2024-01-02

## 2024-01-02 RX ORDER — IBUPROFEN 800 MG/1
800 TABLET, FILM COATED ORAL EVERY 6 HOURS PRN
Status: CANCELLED | OUTPATIENT
Start: 2024-01-02

## 2024-01-02 ASSESSMENT — PAIN SCALES - GENERAL: PAINLEVEL: SEVERE PAIN (6)

## 2024-01-02 NOTE — LETTER
My Depression Action Plan  Name: Kaley Mills   Date of Birth 1985  Date: 1/2/2024    My doctor: Mera Keita   [facility]  [address]  [place] [address] [phone]            GREEN    ZONE   Good Control    What it looks like:   Things are going generally well. You have normal ups and downs. You may even feel depressed from time to time, but bad moods usually last less than a day.   What you need to do:  Continue to care for yourself (see self care plan)  Check your depression survival kit and update it as needed  Follow your physician s recommendations including any medication.  Do not stop taking medication unless you consult with your physician first.             YELLOW         ZONE Getting Worse    What it looks like:   Depression is starting to interfere with your life.   It may be hard to get out of bed; you may be starting to isolate yourself from others.  Symptoms of depression are starting to last most all day and this has happened for several days.   You may have suicidal thoughts but they are not constant.   What you need to do:     Call your care team. Your response to treatment will improve if you keep your care team informed of your progress. Yellow periods are signs an adjustment may need to be made.     Continue your self-care.  Just get dressed and ready for the day.  Don't give yourself time to talk yourself out of it.    Talk to someone in your support network.    Open up your Depression Self-Care Plan/Wellness Kit.             RED    ZONE Medical Alert - Get Help    What it looks like:   Depression is seriously interfering with your life.   You may experience these or other symptoms: You can t get out of bed most days, can t work or engage in other necessary activities, you have trouble taking care of basic hygiene, or basic responsibilities, thoughts of suicide or death that will not go away, self-injurious behavior.      What you need to do:  Call your care team and request a same-day appointment. If they are not available (weekends or after hours) call your local crisis line, emergency room or 911.          Depression Self-Care Plan / Wellness Kit    Many people find that medication and therapy are helpful treatments for managing depression. In addition, making small changes to your everyday life can help to boost your mood and improve your wellbeing. Below are some tips for you to consider. Be sure to talk with your medical provider and/or behavioral health consultant if your symptoms are worsening or not improving.     Sleep   Sleep hygiene  means all of the habits that support good, restful sleep. It includes maintaining a consistent bedtime and wake time, using your bedroom only for sleeping or sex, and keeping the bedroom dark and free of distractions like a computer, smartphone, or television.     Develop a Healthy Routine  Maintain good hygiene. Get out of bed in the morning, make your bed, brush your teeth, take a shower, and get dressed. Don t spend too much time viewing media that makes you feel stressed. Find time to relax each day.    Exercise  Get some form of exercise every day. This will help reduce pain and release endorphins, the  feel good  chemicals in your brain. It can be as simple as just going for a walk or doing some gardening, anything that will get you moving.      Diet  Strive to eat healthy foods, including fruits and vegetables. Drink plenty of water. Avoid excessive sugar, caffeine, alcohol, and other mood-altering substances.     Stay Connected with Others  Stay in touch with friends and family members.    Manage Your Mood  Try deep breathing, massage therapy, biofeedback, or meditation. Take part in fun activities when you can. Try to find something to smile about each day.     Psychotherapy  Be open to working with a therapist if your provider recommends it.     Medication  Be sure to take your  medication as prescribed. Most anti-depressants need to be taken every day. It usually takes several weeks for medications to work. Not all medicines work for all people. It is important to follow-up with your provider to make sure you have a treatment plan that is working for you. Do not stop your medication abruptly without first discussing it with your provider.    Crisis Resources   These hotlines are for both adults and children. They and are open 24 hours a day, 7 days a week unless noted otherwise.    National Suicide Prevention Lifeline   988 or 5-018-465-UCPH (2692)    Crisis Text Line    www.crisistextline.org  Text HOME to 406603 from anywhere in the [place], anytime, about any type of crisis. A live, trained crisis counselor will receive the text and respond quickly.    [facility] Lifeline for LGBTQ Youth  A national crisis intervention and suicide lifeline for LGBTQ youth under 25. Provides a safe place to talk without judgement. Call 1-699.146.9163; text START to 532442 or visit www.theEoscenevorproject.org to talk to a trained counselor.    For [facility] crisis numbers, visit the [facility] website at:  https://[place].gov/dhs/people-we-serve/adults/health-care/mental-health/resources/crisis-contacts.jsp

## 2024-01-02 NOTE — PROGRESS NOTES
[facility] [place]  [address]  [place] [address]  Phone: [phone]  Primary Provider: Mera Keita  Pre-op Performing Provider: CINTHYA STORM      PREOPERATIVE EVALUATION:  Today's date: 1/2/2024    Kaley is a 38 year old, presenting for the following:  Pre-Op Exam        1/2/2024     9:29 AM   Additional Questions   Roomed by Katy   Accompanied by daughter       Surgical Information:  Surgery/Procedure: Left index trigger finger   Surgery Location: [facility]   Surgeon: Mychal Mark MD   Surgery Date: 2/2/2024  Time of Surgery: TBD   Where patient plans to recover: At home alone  Fax number for surgical facility: [phone]    Kaley was seen today for pre-op exam.    Diagnoses and all orders for this visit:    Preop general physical exam    Cyst of joint of left hand  -     ibuprofen (ADVIL/MOTRIN) 800 MG tablet; Take 1 tablet (800 mg) by mouth every 6 hours as needed    Other orders  -     REVIEW OF HEALTH MAINTENANCE PROTOCOL ORDERS        Kaley is cleared for surgery and appropriate anesthesia  Subjective       HPI related to upcoming procedure: cystic lesion left hand        1/2/2024     9:26 AM   Preop Questions   1. Have you ever had a heart attack or stroke? No   2. Have you ever had surgery on your heart or blood vessels, such as a stent placement, a coronary artery bypass, or surgery on an artery in your head, neck, heart, or legs? No   3. Do you have chest pain with activity? No   4. Do you have a history of  heart failure? No   5. Do you currently have a cold, bronchitis or symptoms of other infection? No   6. Do you have a cough, shortness of breath, or wheezing? No   7. Do you or anyone in your family have previous history of blood clots? YES    8. Do you or does anyone in your family have a serious bleeding problem such as prolonged bleeding following surgeries or cuts? No   9. Have you ever had problems with anemia or been told to  take iron pills? No   10. Have you had any abnormal blood loss such as black, tarry or bloody stools, or abnormal vaginal bleeding? No   11. Have you ever had a blood transfusion? No   12. Are you willing to have a blood transfusion if it is medically needed before, during, or after your surgery? Yes   13. Have you or any of your relatives ever had problems with anesthesia? No   14. Do you have sleep apnea, excessive snoring or daytime drowsiness? No   15. Do you have any artifical heart valves or other implanted medical devices like a pacemaker, defibrillator, or continuous glucose monitor? No   16. Do you have artificial joints? No   17. Are you allergic to latex? No   18. Is there any chance that you may be pregnant? No       Health Care Directive:  Patient does not have a Health Care Directive or Living Will: Discussed advance care planning with patient; however, patient declined at this time.    Preoperative Review of :   reviewed - no record of controlled substances prescribed.      Status of Chronic Conditions:  See problem list for active medical problems.  Problems all longstanding and stable, except as noted/documented.  See ROS for pertinent symptoms related to these conditions.    Review of Systems  CONSTITUTIONAL: NEGATIVE for fever, chills, change in weight  ENT/MOUTH: NEGATIVE for ear, mouth and throat problems  RESP: NEGATIVE for significant cough or SOB  CV: NEGATIVE for chest pain, palpitations or peripheral edema    Patient Active Problem List    Diagnosis Date Noted    Class 1 obesity without serious comorbidity with body mass index (BMI) of 30.0 to 30.9 in adult, unspecified obesity type 04/11/2022     Priority: Medium    Bee sting reaction 09/11/2009     Priority: Medium      Past Medical History:   Diagnosis Date    Childhood asthma      Past Surgical History:   Procedure Laterality Date    ORTHOPEDIC SURGERY      Tumor in right hand    TONSILLECTOMY      WISDOM TOOTH EXTRACTION  "Bilateral      Current Outpatient Medications   Medication Sig Dispense Refill    EPINEPHrine (ANY BX GENERIC EQUIV) 0.3 MG/0.3ML injection 2-pack Inject 0.3 mLs (0.3 mg) into the muscle as needed for anaphylaxis May repeat one time in 5-15 minutes if response to initial dose is inadequate. 4 each 3    ibuprofen (ADVIL/MOTRIN) 800 MG tablet Take 1 tablet (800 mg) by mouth every 6 hours as needed 60 tablet 1    Semaglutide-Weight Management (WEGOVY) 2.4 MG/0.75ML pen Inject 2.4 mg Subcutaneous once a week 3 mL 0       Allergies   Allergen Reactions    Bee Venom         Social History     Tobacco Use    Smoking status: Former     Types: Cigarettes    Smokeless tobacco: Never   Substance Use Topics    Alcohol use: Never     Comment: 1 time a month (4-6)     Family History   Problem Relation Age of Onset    Breast Cancer Mother     Breast Cancer Maternal Grandfather      History   Drug Use Unknown         Objective     /76   Pulse 100   Temp 97.9  F (36.6  C) (Temporal)   Resp 12   Ht 1.715 m (5' 7.52\")   Wt 79.9 kg (176 lb 3.2 oz)   LMP  (LMP Unknown)   SpO2 99%   BMI 27.17 kg/m      Physical Exam  GENERAL APPEARANCE: healthy, alert and no distress  HENT: ear canals and TM's normal and nose and mouth without ulcers or lesions  RESP: lungs clear to auscultation - no rales, rhonchi or wheezes  CV: regular rate and rhythm, normal S1 S2, no S3 or S4 and no murmur, click or rub   ABDOMEN: soft, nontender, no HSM or masses and bowel sounds normal  NEURO: Normal strength and tone, sensory exam grossly normal, mentation intact and speech normal    Recent Labs   Lab Test 05/10/23  1540 05/18/22  1315 03/28/22  1120   HGB  --   --  14.6   PLT  --   --  275   NA  --   --  139   POTASSIUM  --   --  4.3   CR  --   --  0.77   A1C 5.9* 5.9*  --         Diagnostics:  No labs were ordered during this visit.   No EKG required for low risk surgery (cataract, skin procedure, breast biopsy, etc).    Revised Cardiac Risk Index " (RCRI):  The patient has the following serious cardiovascular risks for perioperative complications:   - No serious cardiac risks = 0 points     RCRI Interpretation: 0 points: Class I (very low risk - 0.4% complication rate)         Signed Electronically by: Oliver Lemons PA-C  Copy of this evaluation report is provided to requesting physician.      Answers submitted by the patient for this visit:  Patient Health Questionnaire (Submitted on 1/2/2024)  If you checked off any problems, how difficult have these problems made it for you to do your work, take care of things at home, or get along with other people?: Not difficult at all  PHQ9 TOTAL SCORE: 0

## 2024-01-04 ENCOUNTER — OFFICE VISIT (OUTPATIENT)
Dept: FAMILY MEDICINE | Facility: CLINIC | Age: 39
End: 2024-01-04
Payer: COMMERCIAL

## 2024-01-04 VITALS
HEIGHT: 68 IN | SYSTOLIC BLOOD PRESSURE: 104 MMHG | DIASTOLIC BLOOD PRESSURE: 69 MMHG | RESPIRATION RATE: 12 BRPM | BODY MASS INDEX: 26.81 KG/M2 | HEART RATE: 78 BPM | TEMPERATURE: 98.8 F | WEIGHT: 176.9 LBS | OXYGEN SATURATION: 98 %

## 2024-01-04 DIAGNOSIS — E66.3 OVERWEIGHT (BMI 25.0-29.9): Primary | ICD-10-CM

## 2024-01-04 DIAGNOSIS — Z86.39 HISTORY OF OBESITY: ICD-10-CM

## 2024-01-04 PROCEDURE — 99213 OFFICE O/P EST LOW 20 MIN: CPT | Performed by: FAMILY MEDICINE

## 2024-01-04 RX ORDER — PHENTERMINE HYDROCHLORIDE 37.5 MG/1
37.5 TABLET ORAL
Qty: 90 TABLET | Refills: 0 | Status: SHIPPED | OUTPATIENT
Start: 2024-01-04 | End: 2024-05-20

## 2024-01-04 ASSESSMENT — PATIENT HEALTH QUESTIONNAIRE - PHQ9
10. IF YOU CHECKED OFF ANY PROBLEMS, HOW DIFFICULT HAVE THESE PROBLEMS MADE IT FOR YOU TO DO YOUR WORK, TAKE CARE OF THINGS AT HOME, OR GET ALONG WITH OTHER PEOPLE: NOT DIFFICULT AT ALL
SUM OF ALL RESPONSES TO PHQ QUESTIONS 1-9: 0
SUM OF ALL RESPONSES TO PHQ QUESTIONS 1-9: 0

## 2024-01-04 NOTE — PROGRESS NOTES
Assessment & Plan   Problem List Items Addressed This Visit    None  Visit Diagnoses       Overweight (BMI 25.0-29.9)    -  Primary    Relevant Medications    phentermine (ADIPEX-P) 37.5 MG tablet    Semaglutide-Weight Management (WEGOVY) 2.4 MG/0.75ML pen    History of obesity        Relevant Medications    phentermine (ADIPEX-P) 37.5 MG tablet    Semaglutide-Weight Management (WEGOVY) 2.4 MG/0.75ML pen           The patient has had a very successful weight loss and response to Wegovy.  I did provide a refill today in case it will continue to be covered in the new year.  If so, we discussed continuing on that medication regimen.  However, if she is unable to continue to fill her Wegovy, then I did provide her with a prescription for phentermine to take 1/2 tablet up to 1 tablet daily in the morning to try to help with appetite control.  We talked about the importance of exercise and getting at least 150 minutes/week of moderate intensity cardiovascular exercise to maintain weight loss.  We reviewed the nutrition plan as well.  I asked the patient to let me know what she finds out when she goes back to the pharmacy in a month to fill her medication again.  I did tell her not to fill the phentermine prescription unless she is unable to get a hold of the Wegovy.    DONNA CHAVIRA MD  [facility] 38 year [facility] who presents today for follow-up regarding medical weight management.  The patient has had very successful weight loss with the assistance of Wegovy.  She has lost a total of 46 pounds and has gone from a BMI of about 35 down to a BMI of currently 27.  She has found the medication to be very effective at helping to control appetite and cravings.  However, she comes in today as she is concerned that she may not be able to continue to get the medication based on some insurance coverage changes.  She received a letter stating that it would no longer be covered  "in 2024.  She is wondering what other options there might be.  The patient had previously been on phentermine.  She did reasonably well with this and tolerated okay, however, she had a plateau early and had much more significant weight loss with Wegovy.  She notes that she did go into the pharmacy on January 2 and was able to refill her medication without any co-pay.  She is eating a healthy diet and getting in very regular exercise at this time as well.  Weight Loss (Follow-up/)      1/4/2024     2:10 PM   Additional Questions   Roomed by mathew       History of Present Illness       Reason for visit:  Follow up required    She eats 0-1 servings of fruits and vegetables daily.She consumes 2 sweetened beverage(s) daily.She exercises with enough effort to increase her heart rate 20 to 29 minutes per day.  She exercises with enough effort to increase her heart rate 5 days per week.   She is taking medications regularly.           Objective    /69 (BP Location: Left arm, Patient Position: Sitting, Cuff Size: Adult Regular)   Pulse 78   Temp 98.8  F (37.1  C) (Oral)   Resp 12   Ht 1.715 m (5' 7.52\")   Wt 80.2 kg (176 lb 14.4 oz)   LMP  (LMP Unknown)   SpO2 98%   BMI 27.28 kg/m    Body mass index is 27.28 kg/m .  Physical Exam   GENERAL: healthy, alert and no distress                  "

## 2024-01-29 ENCOUNTER — MYC MEDICAL ADVICE (OUTPATIENT)
Dept: FAMILY MEDICINE | Facility: CLINIC | Age: 39
End: 2024-01-29
Payer: COMMERCIAL

## 2024-05-06 DIAGNOSIS — Z86.39 HISTORY OF OBESITY: ICD-10-CM

## 2024-05-06 DIAGNOSIS — E66.3 OVERWEIGHT (BMI 25.0-29.9): ICD-10-CM

## 2024-05-07 RX ORDER — PHENTERMINE HYDROCHLORIDE 37.5 MG/1
37.5 TABLET ORAL
Qty: 90 TABLET | Refills: 0 | OUTPATIENT
Start: 2024-05-07

## 2024-05-07 NOTE — TELEPHONE ENCOUNTER
Spoke with patient. She reports she is no longer taking phentermine and has transitioned to Wegovy. Patient is scheduled for weight loss follow up 5/20/24.    Refill not needed, please remove pended medication.

## 2024-05-17 DIAGNOSIS — Z86.39 HISTORY OF OBESITY: ICD-10-CM

## 2024-05-17 DIAGNOSIS — E66.3 OVERWEIGHT (BMI 25.0-29.9): ICD-10-CM

## 2024-05-17 NOTE — TELEPHONE ENCOUNTER
Medication Request    Medication name: Semaglutide-Weight Management (WEGOVY) 2.4 MG/0.75ML pen     [facility]ed Pharmacy[facility]    When was patient last seen?:  01/04/24    Patient offered appointment:  Yes, 05/20/24    Okay to leave a detailed message: yes

## 2024-05-20 ENCOUNTER — VIRTUAL VISIT (OUTPATIENT)
Dept: FAMILY MEDICINE | Facility: CLINIC | Age: 39
End: 2024-05-20
Payer: COMMERCIAL

## 2024-05-20 DIAGNOSIS — Z86.39 HISTORY OF OBESITY: Primary | ICD-10-CM

## 2024-05-20 PROCEDURE — 99213 OFFICE O/P EST LOW 20 MIN: CPT | Mod: 95 | Performed by: FAMILY MEDICINE

## 2024-05-20 NOTE — PROGRESS NOTES
"Kaley is a 39 year old who is being evaluated via a billable video visit.    How would you like to obtain your AVS? MyChart  If the video visit is dropped, the invitation should be resent by: Text to cell phone: [phone]  Will anyone else be joining your video visit? No      Assessment & Plan   Problem List Items Addressed This Visit       History of obesity - Primary     The patient established with me for treatment of obesity back in 2022.  She initially was prescribed a combination of phentermine and topiramate and then ultimately was switched in May 2023 to Wegovy.  The patient had a BMI at that time of nearly 35 and at this point has lost 61 pounds 27% of her highest weight.  She continues to tolerate Wegovy well although reports that she is awaiting authorization from her insurance to continue to cover it  We  discussed again that we would only use phentermine if she is unable to get a hold of Wegovy and that I would like to wait 2 to 4 weeks to see if approval will happen.  I am confident that she is a good candidate for Wegovy and should qualify based on her response.  Her current approach to nutrition which I think is quite good as well as her current exercise routine which is quite adequate.  Follow-up in 3 months.                  BMI  Estimated body mass index is 27.28 kg/m  as calculated from the following:    Height as of 1/4/24: 1.715 m (5' 7.52\").    Weight as of 1/4/24: 80.2 kg (176 lb 14.4 oz).       Subjective   Kaley is a 39 year old presents today for a medical weight management follow-up visit.  The patient was initially seen by myself back in 2022.  She had an initial good weight loss but then went through some significant stress and had significant weight gain when she followed up again last May.  At that time I did prescribe Wegovy and the patient gradually increase to the 2.4 mg dose.  She has been on the medication basically since that time although did have a 1 month due to " "difficulty getting it from the insurance at the beginning of this year.  She states that she was supposed to take her next dose on Saturday but did not have the medication as she was told by her insurance that another prior authorization is needed to approve another years worth of treatment.  The patient states that she tends to eat small amounts throughout the day.  She eats plenty of fruits, vegetables and protein.  She tries to limit the amount of carbohydrates she takes in.  She is exercising regularly and tolerates the medication very well.        Objective    Vitals - Patient Reported  Weight (Patient Reported): 73 kg (161 lb)  Height (Patient Reported): 170.8 cm (5' 7.25\")  BMI (Based on Pt Reported Ht/Wt): 25.03        Physical Exam   GENERAL: alert and no distress  EYES: Eyes grossly normal to inspection.  No discharge or erythema, or obvious scleral/conjunctival abnormalities.  RESP: No audible wheeze, cough, or visible cyanosis.    SKIN: Visible skin clear. No significant rash, abnormal pigmentation or lesions.  NEURO: Cranial nerves grossly intact.  Mentation and speech appropriate for age.  PSYCH: Appropriate affect, tone, and pace of words          Video-Visit Details    Type of service:  Video Visit   Originating Location (pt. Location): Home    Distant Location (provider location):  On-site  Platform used for Video Visit: Eleonora  Signed Electronically by: DONNA CHAVIRA MD    "

## 2024-05-20 NOTE — ASSESSMENT & PLAN NOTE
The patient established with me for treatment of obesity back in 2022.  She initially was prescribed a combination of phentermine and topiramate and then ultimately was switched in May 2023 to Wegovy.  The patient had a BMI at that time of nearly 35 and at this point has lost 61 pounds 27% of her highest weight.  She continues to tolerate Wegovy well although reports that she is awaiting authorization from her insurance to continue to cover it  We  discussed again that we would only use phentermine if she is unable to get a hold of Wegovy and that I would like to wait 2 to 4 weeks to see if approval will happen.  I am confident that she is a good candidate for Wegovy and should qualify based on her response.  Her current approach to nutrition which I think is quite good as well as her current exercise routine which is quite adequate.  Follow-up in 3 months.

## 2024-05-23 ENCOUNTER — MYC MEDICAL ADVICE (OUTPATIENT)
Dept: FAMILY MEDICINE | Facility: CLINIC | Age: 39
End: 2024-05-23
Payer: COMMERCIAL

## 2024-05-29 ENCOUNTER — NURSE TRIAGE (OUTPATIENT)
Dept: NURSING | Facility: CLINIC | Age: 39
End: 2024-05-29
Payer: COMMERCIAL

## 2024-05-29 NOTE — TELEPHONE ENCOUNTER
Prior auth is needed for Wegovy.  Clinic needs to expedite this prior auth.to insurance company..  Patient has been without Wegovy for two weeks.    Farrah SMITH RN [facility]

## 2024-08-04 ENCOUNTER — HEALTH MAINTENANCE LETTER (OUTPATIENT)
Age: 39
End: 2024-08-04

## 2025-03-26 ENCOUNTER — VIRTUAL VISIT (OUTPATIENT)
Dept: FAMILY MEDICINE | Facility: CLINIC | Age: 40
End: 2025-03-26
Payer: COMMERCIAL

## 2025-03-26 DIAGNOSIS — Z91.030 BEE ALLERGY STATUS: ICD-10-CM

## 2025-03-26 DIAGNOSIS — D22.9 ATYPICAL MOLE: Primary | ICD-10-CM

## 2025-03-26 DIAGNOSIS — M25.842 CYST OF JOINT OF LEFT HAND: ICD-10-CM

## 2025-03-26 RX ORDER — EPINEPHRINE 0.3 MG/.3ML
0.3 INJECTION SUBCUTANEOUS PRN
Qty: 4 EACH | Refills: 3 | Status: SHIPPED | OUTPATIENT
Start: 2025-03-26

## 2025-03-26 RX ORDER — IBUPROFEN 800 MG/1
800 TABLET, FILM COATED ORAL EVERY 6 HOURS PRN
Qty: 60 TABLET | Refills: 1 | Status: SHIPPED | OUTPATIENT
Start: 2025-03-26

## 2025-03-26 NOTE — PROGRESS NOTES
Kaley is a 40 year old who is being evaluated via a billable video visit.    How would you like to obtain your AVS? MyChart  If the video visit is dropped, the invitation should be resent by: Text to cell phone: [phone]  Will anyone else be joining your video visit? No        Subjective   Kaley is a 40 year old, presenting for the following health issues:  No chief complaint on file.      3/26/2025     9:18 AM   Additional Questions   Roomed by Jade Childress CMA   Accompanied by None     Video Start Time: 3:05 PM    History of Present Illness       Reason for visit:  Mole removal   She is taking medications regularly.    Patient states that has had this mole a very long . Raised and seems to have grown streaming pigmentation. Looks atypical.   Fhx of melanoma.         Review of Systems  Constitutional, HEENT, cardiovascular, pulmonary, GI, , musculoskeletal, neuro, skin, endocrine and psych systems are negative, except as otherwise noted.      Objective           Vitals:  No vitals were obtained today due to virtual visit.    Physical Exam   GENERAL: alert and no distress  EYES: Eyes grossly normal to inspection.  No discharge or erythema, or obvious scleral/conjunctival abnormalities.  RESP: No audible wheeze, cough, or visible cyanosis.    SKIN: irregularly shaped and pigmented lesion on her right arm.  NEURO: Cranial nerves grossly intact.  Mentation and speech appropriate for age.  PSYCH: Appropriate affect, tone, and pace of words    Diagnoses and all orders for this visit:    Atypical mole    Bee allergy status  -     EPINEPHrine (ANY BX GENERIC EQUIV) 0.3 MG/0.3ML injection 2-pack; Inject 0.3 mLs (0.3 mg) into the muscle as needed for anaphylaxis. May repeat one time in 5-15 minutes if response to initial dose is inadequate.    Cyst of joint of left hand  -     ibuprofen (ADVIL/MOTRIN) 800 MG tablet; Take 1 tablet (800 mg) by mouth every 6 hours as needed.    Other orders  -     REVIEW OF HEALTH  MAINTENANCE PROTOCOL ORDERS      She will schedule an appt with me for an excisional bx in the next few weeks.       Video-Visit Details    Type of service:  Video Visit   Video End Time:3:15 PM  Originating Location (pt. Location): Home    Distant Location (provider location):  On-site  Platform used for Video Visit: Eleonora  Signed Electronically by: Oliver Lemons PA-C

## 2025-04-14 ENCOUNTER — NURSE TRIAGE (OUTPATIENT)
Dept: NURSING | Facility: CLINIC | Age: 40
End: 2025-04-14
Payer: COMMERCIAL

## 2025-04-15 ENCOUNTER — VIRTUAL VISIT (OUTPATIENT)
Dept: FAMILY MEDICINE | Facility: CLINIC | Age: 40
End: 2025-04-15
Payer: COMMERCIAL

## 2025-04-15 DIAGNOSIS — S30.861A TICK BITE OF ABDOMINAL WALL, INITIAL ENCOUNTER: Primary | ICD-10-CM
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
## 2025-04-15 PROCEDURE — 98005 SYNCH AUDIO-VIDEO EST LOW 20: CPT | Performed by: FAMILY MEDICINE

## 2025-04-15 RX ORDER — DOXYCYCLINE 100 MG/1
100 CAPSULE ORAL 2 TIMES DAILY
Qty: 28 CAPSULE | Refills: 0 | Status: SHIPPED | OUTPATIENT
Start: 2025-04-15

## 2025-04-15 NOTE — PROGRESS NOTES
Kaley is a 40 year old who is being evaluated via a billable video visit.    How would you like to obtain your AVS? MyChart  If the video visit is dropped, the invitation should be resent by: Text to cell phone: [phone]  Will anyone else be joining your video visit? No      Assessment & Plan     (S30.861A,  W57.XXXA) Tick bite of abdominal wall, initial encounter  (primary encounter diagnosis)  Comment: discussed with her. Given two ticks found attached on two different days, will treat for possible lyme. Prescription sent and Discussed risks/benefits/side effects with the patient. All questions answered. The patient indicates understanding of these issues and agrees with the plan.   Plan: doxycycline hyclate (VIBRAMYCIN) 100 MG capsule            Subjective   Kaley is a 40 year old, presenting for the following health issues:  Tick Bite        4/15/2025     7:02 AM   Additional Questions   Roomed by CANDY Oviedo   Accompanied by self     Video Start Time: 7:15 AM    HPI      Tick Bite x 2  Lower back - found one 4 days ago  On stomach- found one yesterday  - had to dig into the spot to get the tick out  Very little ticks     Has a dog         Objective         Vitals:  No vitals were obtained today due to virtual visit.    Physical Exam   GENERAL: alert and no distress  EYES: Eyes grossly normal to inspection.  No discharge or erythema, or obvious scleral/conjunctival abnormalities.  RESP: No audible wheeze, cough, or visible cyanosis.    SKIN: Visible skin clear. No significant rash, abnormal pigmentation or lesions.  NEURO: Cranial nerves grossly intact.  Mentation and speech appropriate for age.  PSYCH: Appropriate affect, tone, and pace of words        Video-Visit Details    Type of service:  Video Visit   Video End Time:7:31 AM  Originating Location (pt. Location): Home    Distant Location (provider location):  On-site  Platform used for Video Visit: Soledad  Signed Electronically by: Alexsandra KLINE  MD Jared

## 2025-04-15 NOTE — TELEPHONE ENCOUNTER
"Nurse Triage SBAR    Is this a 2nd Level Triage? NO    Situation: Tick Bite    Background: Patient reports a couple of days ago noticed a deer tick on back. States today found one on her abdomen. No rash associated with back, states \"bulls-eye\" rash on abdomen. Reports was able to remove it \"imbedded pretty good in my stomach.\" After removed, abdominal rash 1.5 inches around bulls eye.  \"White ring around original bite baldemar,\" \"a rash about 1.5 inches wide and tall around white ring.\"     Assessment: \"Bulls-eye\" rash on abdomen. \"White ring around original bite baldemar,\" \"a rash about 1.5 inches wide and tall around white ring.\"     Protocol Recommended Disposition:   See PCP Within 24 Hours    Recommendation: Care advice given. Patient agreeable to plan and verbalizes understanding. Patient states will plan to upload images to [employer]. Informed patient that will route encounter to PCP care team.      Routed to provider    Does the patient meet one of the following criteria for ADS visit consideration? 16+ years old, with an [facility] PCP     TIP  Providers, please consider if this condition is appropriate for management at one of our Acute and Diagnostic Services sites.     If patient is a good candidate, please use dotphrase <dot>triageresponse and select Refer to ADS to document.    Reason for Disposition   Red ring or bull's-eye rash occurs at tick bite    Additional Information   Negative: [1] 2 to 14 days following tick bite AND [2] widespread rash with fever occurs   Negative: Not a tick bite   Negative: Sounds like a life-threatening emergency to the triager   Negative: [1] 2 to 14 days following tick bite AND [2] severe headache with fever occurs   Negative: Patient sounds very sick or weak to the triager   Negative: [1] Fever AND [2] spreading red area or streak   Negative: [1] Fever AND [2] area is very tender to touch   Negative: [1] Red streak or red line AND [2] length > 2 inches (5 cm)   Negative: Can't " remove live tick (after trying Care Advice)   Negative: [1] 2 to 14 days following tick bite AND [2] fever AND [3] no rash or headache   Negative: [1] 2 to 14 days following tick bite AND [2] widespread rash or headache AND [3] no fever   Negative: [1] Red or very tender (to touch) area AND [2] started over 24 hours after the bite    Protocols used: Tick Bite-A-

## 2025-04-22 ENCOUNTER — OFFICE VISIT (OUTPATIENT)
Dept: FAMILY MEDICINE | Facility: CLINIC | Age: 40
End: 2025-04-22
Payer: COMMERCIAL

## 2025-04-22 VITALS
TEMPERATURE: 98.3 F | HEART RATE: 69 BPM | BODY MASS INDEX: 26.4 KG/M2 | DIASTOLIC BLOOD PRESSURE: 72 MMHG | RESPIRATION RATE: 20 BRPM | SYSTOLIC BLOOD PRESSURE: 98 MMHG | HEIGHT: 67 IN | WEIGHT: 168.2 LBS | OXYGEN SATURATION: 100 %

## 2025-04-22 DIAGNOSIS — D22.9 CHANGE IN SKIN MOLE: Primary | ICD-10-CM

## 2025-04-22 PROCEDURE — 3074F SYST BP LT 130 MM HG: CPT | Performed by: PHYSICIAN ASSISTANT

## 2025-04-22 PROCEDURE — 11401 EXC TR-EXT B9+MARG 0.6-1 CM: CPT | Performed by: PHYSICIAN ASSISTANT

## 2025-04-22 PROCEDURE — 3078F DIAST BP <80 MM HG: CPT | Performed by: PHYSICIAN ASSISTANT

## 2025-04-22 NOTE — PROGRESS NOTES
Subjective   Kaley is a 40 year old, presenting for the following health issues:  Derm Problem (Mole - right upper arm, several years/changing)      4/22/2025     1:05 PM   Additional Questions   Roomed by Shannon Hurt CMA   Accompanied by None         4/22/2025     1:05 PM   Patient Reported Additional Medications   Patient reports taking the following new medications none     History of Present Illness       Reason for visit:  Mole biopsy  Symptom onset:  More than a month   She is taking medications regularly.      Changing mole      Review of Systems  Constitutional, HEENT, cardiovascular, pulmonary, GI, , musculoskeletal, neuro, skin, endocrine and psych systems are negative, except as otherwise noted.      Objective    LMP 04/17/2025 (Exact Date)   There is no height or weight on file to calculate BMI.  Physical Exam   7mm nodular mole right lateral upper arm.       procedure: The area was prepped and appropriately anesthetized. Using the usual technique, punch biopsy size 8 mm was performed. Hemostasis was achieved using hyfrecation. An appropriate  dressing was applied.  The procedure was well tolerated and without complications. Specimen was sent.    Kaley was seen today for derm problem.    Diagnoses and all orders for this visit:    Change in skin mole  -     Surgical Pathology Exam  -     EXC BENIGN SKIN LESION TRUNK/ARM/LEG 0.6-1.0 CM        Plan: Follow up: The specimen is labelled and sent to pathology for evaluation. Wound care instructions provided.  Patient was instructed to be alert for any signs of cutaneous infection.    Signed Electronically by: Oliver Lemons PA-C

## 2025-04-28 LAB
PATH REPORT.COMMENTS IMP SPEC: NORMAL
PATH REPORT.COMMENTS IMP SPEC: NORMAL
PATH REPORT.FINAL DX SPEC: NORMAL
PATH REPORT.GROSS SPEC: NORMAL
PATH REPORT.MICROSCOPIC SPEC OTHER STN: NORMAL
PATH REPORT.RELEVANT HX SPEC: NORMAL
PHOTO IMAGE: NORMAL

## 2025-06-16 ENCOUNTER — TELEPHONE (OUTPATIENT)
Dept: FAMILY MEDICINE | Facility: CLINIC | Age: 40
End: 2025-06-16
Payer: COMMERCIAL

## 2025-06-16 NOTE — TELEPHONE ENCOUNTER
Prior Authorization Retail Medication Request    Medication/Dose: Wegovy 2.4mg/0.75ml  Diagnosis and ICD code (if different than what is on RX):    New/renewal/insurance change PA/secondary ins. PA:  Previously Tried and Failed:    Rationale:      Insurance   Primary: [place] PMAP  Insurance ID:  [id number]    Secondary (if applicable):[place] Medicaid  Insurance ID:  [id number]    Thank you,  Jose Howe [facility]

## 2025-06-17 NOTE — TELEPHONE ENCOUNTER
Retail Pharmacy Prior Authorization Team   Phone: [phone]    PA Initiation    Medication: WEGOVY 2.4 MG/0.75ML SC SOAJ  Insurance Company: [employer] - Phone [phone] Fax [phone]  Pharmacy Filling the Rx: [facility]  [place], [place] - [address]  Filling Pharmacy Phone: [phone]  Filling Pharmacy Fax:    Start Date: 6/17/2025    [id number]

## 2025-06-18 NOTE — TELEPHONE ENCOUNTER
PRIOR AUTHORIZATION DENIED    Medication: WEGOVY 2.4 MG/0.75ML SC SOAJ  Insurance Company: [employer] - Phone [phone] Fax [phone]  Denial Date: 6/18/2025  Denial Reason(s):           Appeal Information:         Patient Notified: No

## 2025-07-24 ENCOUNTER — OFFICE VISIT (OUTPATIENT)
Dept: FAMILY MEDICINE | Facility: CLINIC | Age: 40
End: 2025-07-24
Payer: COMMERCIAL

## 2025-07-24 VITALS
WEIGHT: 178.1 LBS | HEIGHT: 68 IN | BODY MASS INDEX: 26.99 KG/M2 | HEART RATE: 85 BPM | TEMPERATURE: 98.3 F | OXYGEN SATURATION: 100 % | DIASTOLIC BLOOD PRESSURE: 90 MMHG | SYSTOLIC BLOOD PRESSURE: 134 MMHG | RESPIRATION RATE: 16 BRPM

## 2025-07-24 DIAGNOSIS — Z30.011 OCP (ORAL CONTRACEPTIVE PILLS) INITIATION: ICD-10-CM

## 2025-07-24 DIAGNOSIS — Z80.3 FAMILY HISTORY OF BREAST CANCER: ICD-10-CM

## 2025-07-24 DIAGNOSIS — Z00.00 ROUTINE GENERAL MEDICAL EXAMINATION AT A HEALTH CARE FACILITY: Primary | ICD-10-CM

## 2025-07-24 DIAGNOSIS — Z11.3 SCREEN FOR STD (SEXUALLY TRANSMITTED DISEASE): ICD-10-CM

## 2025-07-24 DIAGNOSIS — Z80.8 FAMILY HISTORY OF MALIGNANT MELANOMA: ICD-10-CM

## 2025-07-24 DIAGNOSIS — Z12.4 CERVICAL CANCER SCREENING: ICD-10-CM

## 2025-07-24 DIAGNOSIS — Z12.31 VISIT FOR SCREENING MAMMOGRAM: ICD-10-CM

## 2025-07-24 LAB
ALBUMIN SERPL BCG-MCNC: 4.4 G/DL (ref 3.5–5.2)
ALP SERPL-CCNC: 67 U/L (ref 40–150)
ALT SERPL W P-5'-P-CCNC: 22 U/L (ref 0–50)
ANION GAP SERPL CALCULATED.3IONS-SCNC: 10 MMOL/L (ref 7–15)
AST SERPL W P-5'-P-CCNC: 25 U/L (ref 0–45)
BILIRUB SERPL-MCNC: 0.3 MG/DL
BUN SERPL-MCNC: 10.9 MG/DL (ref 6–20)
C TRACH DNA SPEC QL NAA+PROBE: NEGATIVE
CALCIUM SERPL-MCNC: 9.6 MG/DL (ref 8.8–10.4)
CHLORIDE SERPL-SCNC: 106 MMOL/L (ref 98–107)
CHOLEST SERPL-MCNC: 163 MG/DL
CLUE CELLS: NORMAL
CREAT SERPL-MCNC: 0.92 MG/DL (ref 0.51–0.95)
EGFRCR SERPLBLD CKD-EPI 2021: 81 ML/MIN/1.73M2
ERYTHROCYTE [DISTWIDTH] IN BLOOD BY AUTOMATED COUNT: 12.6 % (ref 10–15)
EST. AVERAGE GLUCOSE BLD GHB EST-MCNC: 120 MG/DL
FASTING STATUS PATIENT QL REPORTED: ABNORMAL
FASTING STATUS PATIENT QL REPORTED: NORMAL
GLUCOSE SERPL-MCNC: 109 MG/DL (ref 70–99)
HBA1C MFR BLD: 5.8 % (ref 0–5.6)
HCG UR QL: NEGATIVE
HCO3 SERPL-SCNC: 24 MMOL/L (ref 22–29)
HCT VFR BLD AUTO: 41.3 % (ref 35–47)
HDLC SERPL-MCNC: 63 MG/DL
HGB BLD-MCNC: 13.6 G/DL (ref 11.7–15.7)
HPV HR 12 DNA CVX QL NAA+PROBE: NEGATIVE
HPV16 DNA CVX QL NAA+PROBE: NEGATIVE
HPV18 DNA CVX QL NAA+PROBE: NEGATIVE
HUMAN PAPILLOMA VIRUS FINAL DIAGNOSIS: NORMAL
LDLC SERPL CALC-MCNC: 92 MG/DL
MCH RBC QN AUTO: 32.9 PG (ref 26.5–33)
MCHC RBC AUTO-ENTMCNC: 32.9 G/DL (ref 31.5–36.5)
MCV RBC AUTO: 100 FL (ref 78–100)
N GONORRHOEA DNA SPEC QL NAA+PROBE: NEGATIVE
NONHDLC SERPL-MCNC: 100 MG/DL
PLATELET # BLD AUTO: 313 10E3/UL (ref 150–450)
POTASSIUM SERPL-SCNC: 4.3 MMOL/L (ref 3.4–5.3)
PROT SERPL-MCNC: 7.1 G/DL (ref 6.4–8.3)
RBC # BLD AUTO: 4.13 10E6/UL (ref 3.8–5.2)
SODIUM SERPL-SCNC: 140 MMOL/L (ref 135–145)
SPECIMEN TYPE: NORMAL
SPECIMEN TYPE: NORMAL
TRICHOMONAS, WET PREP: NORMAL
TRIGL SERPL-MCNC: 38 MG/DL
TSH SERPL DL<=0.005 MIU/L-ACNC: 1.64 UIU/ML (ref 0.3–4.2)
VIT D+METAB SERPL-MCNC: 34 NG/ML (ref 20–50)
WBC # BLD AUTO: 6.6 10E3/UL (ref 4–11)
WBC'S/HIGH POWER FIELD, WET PREP: NORMAL
YEAST, WET PREP: NORMAL

## 2025-07-24 SDOH — HEALTH STABILITY: PHYSICAL HEALTH: ON AVERAGE, HOW MANY DAYS PER WEEK DO YOU ENGAGE IN MODERATE TO STRENUOUS EXERCISE (LIKE A BRISK WALK)?: 5 DAYS

## 2025-07-24 ASSESSMENT — SOCIAL DETERMINANTS OF HEALTH (SDOH): HOW OFTEN DO YOU GET TOGETHER WITH FRIENDS OR RELATIVES?: PATIENT DECLINED

## 2025-07-24 ASSESSMENT — PAIN SCALES - GENERAL: PAINLEVEL_OUTOF10: MODERATE PAIN (6)

## 2025-07-24 NOTE — PROGRESS NOTES
Preventive Care Visit  [facility]  Jul 24, 2025      Assessment & Plan      Diagnosis Comments   1. Routine general medical examination at a health care facility  TSH with free T4 reflex, CBC with platelets, Comprehensive metabolic panel, Lipid Profile, Hemoglobin A1c, Vitamin D Deficiency       2. Cervical cancer screening  HPV and Gynecologic Cytology Panel - Recommended Age 30 - 65 Years       3. Family history of malignant melanoma  Adult Dermatology  Referral       4. Family history of breast cancer  MA Screen Bilateral w/Erich       5. OCP (oral contraceptive pills) initiation  HCG Qual, Urine ([id number])       6. Visit for screening mammogram  MA Screen Bilateral w/Erich       7. Screen for STD (sexually transmitted disease)  Wet prep - Clinic Collect, NEISSERIA GONORRHOEA PCR, CHLAMYDIA TRACHOMATIS PCR         Home life: lives with daughter + dather   Occupation:self-employed   Sexually active:yes, no issues. Using condoms.  Does not have any active issues but would like to get STI testing done as she has not been sexually active for a long time.  Collected.  Safety concerns: none   Diet/exercise:see discussion below   Family history of cancers:mom had breast cancer ( age onset 50, had elective mastectomy), dad had melanoma.  Paternal grandma also had breast cancer.  She says she already underwent genetic testing with 23 and me and would like to hold on seeing genetics  Eye exam/dental exam: need dentist    Alcohol use: occasionally   Tobacco use/marijuana use/drug use: none   Mental health:stable   Vaccines:hold on COVID for now   Blood work:none     Menses/menopause: monthly  Last Pap smear: Last one on file is 2017-normal cytology and normal HPV  Mammogram: (+) fmhx of breast cancer, will order baseline mammogram today/     History of obesity  Historically was on Wegovy for prediabetes and obesity.  Wegovy was very beneficial.  Her most  recent provider told her that she did not qualify for Wegovy anymore.  She been on it for 2 months.  She did gain about 15 pounds back which is concerning for her.  She does feel like it is more so her muscle mass that she is regaining and not actual fat.  Is wondering if she should resume the Wegovy  Plan:  - Reviewed with patient that you can have loss of muscle mass with precipitous weight loss.  It is not uncommon on GLP-1's to see some loss of muscle mass.  If she is exercising regularly, leading a healthy lifestyle and blood work is okay, we should monitor off of any medications  - Currently, the guidelines are to be on GLP-1's for weight loss if BMI is above 30 or if they are overweight with comorbidities.  - Patient is understanding of this information.  Will touch base with her about next steps once I have the results    Birth control counseling:  Is now sexually active with a new partner after many years of hiatus.  Is interested in not on pill form of birth control.  Historically used to be on Depo.  Is wondering if that is a good option.  Plan:  - Discussed with patient that Depo may be not be a good option given side effect of weight gain  - Discussed options including Nexplanon, IUD  - Patient is interested in pursuing the Nexplanon.  Will have her come back for the Nexplanon insertion  - In the interim, we will do a urine pregnancy today and start her on CONNIE's.  Patient is above age 35 but has no contraindication for OCP at this time     Family history of melanoma:  Patient does not see Derm.  Has not had a skin check before.  Has several moles on her midis.  Given family history, I think it is a good idea for her to get a full skin check.  Referral placed.    The longitudinal plan of care for the diagnosis(es)/condition(s) as documented were addressed during this visit. Due to the added complexity in care, I will continue to support Kaley in the subsequent management and with ongoing continuity of  "care.    The longitudinal plan of care for the diagnosis(es)/condition(s) as documented were addressed during this visit. Due to the added complexity in care, I will continue to support Kaley in the subsequent management and with ongoing continuity of care.    Patient has been advised of split billing requirements and indicates understanding: Yes       30 minutes in addition to the annual preventative spent on the date of the encounter doing chart review, history and exam, documentation and further activities per the note    BMI  Estimated body mass index is 27.28 kg/m  as calculated from the following:    Height as of this encounter: 1.721 m (5' 7.75\").    Weight as of this encounter: 80.8 kg (178 lb 1.6 oz).   Weight management plan: Discussed healthy diet and exercise guidelines    Counseling  Appropriate preventive services were addressed with this patient via screening, questionnaire, or discussion as appropriate for fall prevention, nutrition, physical activity, Tobacco-use cessation, social engagement, weight loss and cognition.  Checklist reviewing preventive services available has been given to the patient.  Reviewed patient's diet, addressing concerns and/or questions.   Reviewed preventive health counseling, as reflected in patient instructions        Subjective   Kaley is a 40 year old, presenting for the following:  Physical (PAP and Pt is not fasting )        7/24/2025    10:00 AM   Additional Questions   Roomed by Sofie SMITH   Accompanied by Self          HPI    Advance Care Planning    Discussed advance care planning with patient; informed AVS has link to [facility].        7/24/2025   General Health   How would you rate your overall physical health? Excellent   Feel stress (tense, anxious, or unable to sleep) Only a little   (!) STRESS CONCERN      7/24/2025   Nutrition   Three or more servings of calcium each day? Yes   Diet: Regular (no restrictions)   How many servings of fruit and " vegetables per day? (!) 2-3   How many sweetened beverages each day? 0-1         7/24/2025   Exercise   Days per week of moderate/strenous exercise 5 days         7/24/2025   Social Factors   Frequency of gathering with friends or relatives Patient declined   Worry food won't last until get money to buy more No   Food not last or not have enough money for food? No   Do you have housing? (Housing is defined as stable permanent housing and does not include staying outside in a car, in a tent, in an abandoned building, in an overnight shelter, or couch-surfing.) Yes   Are you worried about losing your housing? No   Lack of transportation? No   Unable to get utilities (heat,electricity)? No         7/24/2025   Dental   Dentist two times every year? Yes     Today's PHQ-9 Score:       3/26/2025     9:18 AM   PHQ-9 SCORE   PHQ-9 Total Score MyChart 0   PHQ-9 Total Score 0        Proxy-reported           7/24/2025   Substance Use   Alcohol more than 3/day or more than 7/wk No   Do you use any other substances recreationally? (!) DECLINE     Social History     Tobacco Use    Smoking status: Former     Types: Cigarettes    Smokeless tobacco: Never   Vaping Use    Vaping status: Never Used   Substance Use Topics    Alcohol use: Never     Comment: 1 time a month (4-6)    Drug use: Never           1/2/2024   LAST FHS-7 RESULTS   1st degree relative breast or ovarian cancer Yes    Any relative bilateral breast cancer Unknown    Any male have breast cancer No    Any ONE woman have BOTH breast AND ovarian cancer No    Any woman with breast cancer before 50yrs No    2 or more relatives with breast AND/OR ovarian cancer Yes    2 or more relatives with breast AND/OR bowel cancer Yes        Proxy-reported        Mammogram Screening - Mammogram every 1-2 years updated in Health Maintenance based on mutual decision making        7/24/2025   STI Screening   New sexual partner(s) since last STI/HIV test? (!) YES      History of abnormal  Pap smear: No - age 30- 64 PAP with HPV every 5 years recommended        3/9/2017     1:53 PM 2015     4:01 PM 2011    11:16 AM   PAP / HPV   PAP Negative for squamous intraepithelial lesion or malignancy  Electronically signed by Blank Chaudhary CT (ASCP) on 3/15/2017 at  2:55 PM    Negative for squamous intraepithelial lesion or malignancy  Electronically signed by Tiera Alvarenga CT (ASCP) on 2015 at 10:27 AM       PAP (Historical)   NIL            2025   Contraception/Family Planning   Questions about contraception or family planning (!) YES    What are your periods like? Regular        Reviewed and updated as needed this visit by Provider   Tobacco  Allergies  Meds  Problems  Med Hx  Surg Hx  Fam Hx            Past Medical History:   Diagnosis Date    Childhood asthma      Past Surgical History:   Procedure Laterality Date    ORTHOPEDIC SURGERY      Tumor in right hand    TONSILLECTOMY      WISDOM TOOTH EXTRACTION Bilateral      OB History    Para Term  AB Living   1 1 1 0 0 1   SAB IAB Ectopic Multiple Live Births   0 0 0 0 1      # Outcome Date GA Lbr Cm/2nd Weight Sex Type Anes PTL Lv   1 Term 19 38w6d  3.345 kg (7 lb 6 oz) F Vag-Spont EPI  TORSTEN      Name: LEVI CURIEL-RONN      Apgar1: 8  Apgar5: 9     Lab work is in process  Labs reviewed in EPIC  BP Readings from Last 3 Encounters:   25 (!) 134/90   25 98/72   24 104/69    Wt Readings from Last 3 Encounters:   25 80.8 kg (178 lb 1.6 oz)   25 76.3 kg (168 lb 3.2 oz)   24 80.2 kg (176 lb 14.4 oz)                  Patient Active Problem List   Diagnosis    Bee sting reaction    History of obesity     Past Surgical History:   Procedure Laterality Date    ORTHOPEDIC SURGERY      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 May repeat one time in 5-15 minutes if response to initial dose is inadequate. 4 each 3    ibuprofen (ADVIL/MOTRIN) 800 MG tablet Take 1 tablet (800 mg) by mouth every 6 hours as needed. 60 tablet 1     Allergies   Allergen Reactions    Bee Venom      Recent Labs   Lab Test 05/10/23  1540 05/18/22  1318 05/18/22  1315 03/28/22  1120   A1C 5.9*  --  5.9*  --    LDL  --  84  --   --    HDL  --  45*  --   --    TRIG  --  41  --   --    CR  --   --   --  0.77   GFRESTIMATED  --   --   --  >90   POTASSIUM  --   --   --  4.3   TSH  --   --   --  1.51               Review of Systems  Constitutional, neuro, ENT, endocrine, pulmonary, cardiac, gastrointestinal, genitourinary, musculoskeletal, integument and psychiatric systems are negative, except as otherwise noted.     Objective    Exam  BP (!) 134/90   Pulse 85   Temp 98.3  F (36.8  C) (Oral)   Resp 16   Ht 1.721 m (5' 7.75\")   Wt 80.8 kg (178 lb 1.6 oz)   LMP 07/18/2025 (Exact Date)   SpO2 100%   BMI 27.28 kg/m     Estimated body mass index is 27.28 kg/m  as calculated from the following:    Height as of this encounter: 1.721 m (5' 7.75\").    Weight as of this encounter: 80.8 kg (178 lb 1.6 oz).    Physical Exam  GENERAL: alert and no distress  EYES: Eyes grossly normal to inspection, and conjunctivae and sclerae normal  HENT: ear canals and TM's normal, nose and mouth without ulcers or lesions  NECK: no adenopathy, no asymmetry, masses, or scars  RESP: lungs clear to auscultation - no rales, rhonchi or wheezes  CV: regular rate and rhythm,no murmur, click or rub, no " peripheral edema  MS: no gross musculoskeletal defects noted, no edema  PSYCH: mentation appears normal, affect anxious but overall appropriate   :   On inspection, no obvious lesions, swelling or odor   Normal pubic hair growth extending to internalthigh bilaterally   Clitoral carbajal intact and urethra midline   Vaginal mucosa healthy and pink   Cervix normal in appearance without any significant friability or CMT.   No significant vaginal discharge at the posterior fornix   Pap smear collected          Signed Electronically by: Christine Boucher DO

## 2025-07-24 NOTE — PATIENT INSTRUCTIONS
Patient Education   Preventive Care Advice   This is general advice given by our system to help you stay healthy. However, your care team may have specific advice just for you. Please talk to your care team about your preventive care needs.  Nutrition  Eat 5 or more servings of fruits and vegetables each day.  Try wheat bread, brown rice and whole grain pasta (instead of white bread, rice, and pasta).  Get enough calcium and vitamin D. Check the label on foods and aim for 100% of the RDA (recommended daily allowance).  Lifestyle  Exercise at least 150 minutes each week  (30 minutes a day, 5 days a week).  Do muscle strengthening activities 2 days a week. These help control your weight and prevent disease.  No smoking.  Wear sunscreen to prevent skin cancer.  Have a dental exam and cleaning every 6 months.  Yearly exams  See your health care team every year to talk about:  Any changes in your health.  Any medicines your care team has prescribed.  Preventive care, family planning, and ways to prevent chronic diseases.  Shots (vaccines)   HPV shots (up to age 26), if you've never had them before.  Hepatitis B shots (up to age 59), if you've never had them before.  COVID-19 shot: Get this shot when it's due.  Flu shot: Get a flu shot every year.  Tetanus shot: Get a tetanus shot every 10 years.  Pneumococcal, hepatitis A, and RSV shots: Ask your care team if you need these based on your risk.  Shingles shot (for age 50 and up)  General health tests  Diabetes screening:  Starting at age 35, Get screened for diabetes at least every 3 years.  If you are younger than age 35, ask your care team if you should be screened for diabetes.  Cholesterol test: At age 39, start having a cholesterol test every 5 years, or more often if advised.  Bone density scan (DEXA): At age 50, ask your care team if you should have this scan for osteoporosis (brittle bones).  Hepatitis C: Get tested at least once in your life.  STIs (sexually  transmitted infections)  Before age 24: Ask your care team if you should be screened for STIs.  After age 24: Get screened for STIs if you're at risk. You are at risk for STIs (including HIV) if:  You are sexually active with more than one person.  You don't use condoms every time.  You or a partner was diagnosed with a sexually transmitted infection.  If you are at risk for HIV, ask about PrEP medicine to prevent HIV.  Get tested for HIV at least once in your life, whether you are at risk for HIV or not.  Cancer screening tests  Cervical cancer screening: If you have a cervix, begin getting regular cervical cancer screening tests starting at age 21.  Breast cancer scan (mammogram): If you've ever had breasts, begin having regular mammograms starting at age 40. This is a scan to check for breast cancer.  Colon cancer screening: It is important to start screening for colon cancer at age 45.  Have a colonoscopy test every 10 years (or more often if you're at risk) Or, ask your provider about stool tests like a FIT test every year or Cologuard test every 3 years.  To learn more about your testing options, visit:   .  For help making a decision, visit:   https://bit.ly/bd73399.  Prostate cancer screening test: If you have a prostate, ask your care team if a prostate cancer screening test (PSA) at age 55 is right for you.  Lung cancer screening: If you are a current or former smoker ages 50 to 80, ask your care team if ongoing lung cancer screenings are right for you.  For informational purposes only. Not to replace the advice of your health care provider. Copyright   2023 [facility]. All rights reserved. Clinically reviewed by the [facility]. [employer] 129553 - REV 01/24.  Safer Sex: Care Instructions  Overview  Safer sex is a way to reduce your risk of getting a sexually transmitted infection (STI). It can also help prevent pregnancy.  Several products can help you practice  safer sex and reduce your chance of STIs. One of the best is a condom. There are internal and external condoms. You can use a special rubber sheet (dental dam) for protection during oral sex. Disposable gloves can keep your hands from touching blood, semen, or other body fluids that can carry infections.  Remember that birth control methods such as diaphragms, IUDs, foams, and birth control pills do not stop you from getting STIs.  Follow-up care is a key part of your treatment and safety. Be sure to make and go to all appointments, and call your doctor if you are having problems. It's also a good idea to know your test results and keep a list of the medicines you take.  How can you care for yourself at home?  Think about getting vaccinated to help prevent hepatitis A, hepatitis B, and human papillomavirus (HPV). They can be spread through sex.  Use a condom every time you have sex. Use an external condom, which goes on the penis. Or use an internal condom, which goes into the vagina or anus.  Make sure you use the right size external condom. A condom that's too small can break easily. A condom that's too big can slip off during sex.  Use a new condom each time you have sex. Be careful not to poke a hole in the condom when you open the wrapper.  Don't use an internal condom and an external condom at the same time.  Never use petroleum jelly (such as Vaseline), grease, hand lotion, baby oil, or anything with oil in it. These products can make holes in the condom.  After intercourse, hold the edge of the condom as you remove it. This will help keep semen from spilling out of the condom.  Do not have sex with anyone who has symptoms of an STI, such as sores on the genitals or mouth.  Do not drink a lot of alcohol or use drugs before sex.  Limit your sex partners. Sex with one partner who has sex only with you can reduce your risk of getting an STI.  Don't share sex toys. But if you do share them, use a condom and clean  "the sex toys between each use.  Talk to any partners before you have sex. Talk about what you feel comfortable with and whether you have any boundaries with sex. And find out if your partner or partners may be at risk for any STI. Keep in mind that a person may be able to spread an STI even if they do not have symptoms. You and any partners may want to get tested for STIs.  Where can you learn more?  Go to https://www.[facility].net/patiented  Enter B608 in the search box to learn more about \"Safer Sex: Care Instructions.\"  Current as of: April 30, 2024  Content Version: 14.5    0919-7173 [employer].   Care instructions adapted under license by your healthcare professional. If you have questions about a medical condition or this instruction, always ask your healthcare professional. [employer] disclaims any warranty or liability for your use of this information.       "

## 2025-07-25 ENCOUNTER — RESULTS FOLLOW-UP (OUTPATIENT)
Dept: OBGYN | Facility: CLINIC | Age: 40
End: 2025-07-25
Payer: COMMERCIAL

## 2025-07-25 DIAGNOSIS — Z30.011 OCP (ORAL CONTRACEPTIVE PILLS) INITIATION: Primary | ICD-10-CM

## 2025-07-25 DIAGNOSIS — E66.3 OVERWEIGHT (BMI 25.0-29.9): ICD-10-CM

## 2025-07-25 DIAGNOSIS — R73.03 PREDIABETES: ICD-10-CM

## 2025-07-25 RX ORDER — DROSPIRENONE AND ETHINYL ESTRADIOL 0.03MG-3MG
1 KIT ORAL DAILY
Qty: 28 TABLET | Refills: 5 | Status: SHIPPED | OUTPATIENT
Start: 2025-07-25

## 2025-07-28 ENCOUNTER — TELEPHONE (OUTPATIENT)
Dept: FAMILY MEDICINE | Facility: CLINIC | Age: 40
End: 2025-07-28
Payer: COMMERCIAL

## 2025-07-28 ENCOUNTER — PATIENT OUTREACH (OUTPATIENT)
Dept: CARE COORDINATION | Facility: CLINIC | Age: 40
End: 2025-07-28
Payer: COMMERCIAL

## 2025-07-28 DIAGNOSIS — E66.3 OVERWEIGHT (BMI 25.0-29.9): Primary | ICD-10-CM

## 2025-07-29 LAB
BKR AP ASSOCIATED HPV REPORT: NORMAL
BKR LAB AP GYN ADEQUACY: NORMAL
BKR LAB AP GYN INTERPRETATION: NORMAL
BKR LAB AP LMP: NORMAL
BKR LAB AP PREVIOUS ABNORMAL: NORMAL
PATH REPORT.COMMENTS IMP SPEC: NORMAL
PATH REPORT.COMMENTS IMP SPEC: NORMAL
PATH REPORT.RELEVANT HX SPEC: NORMAL

## 2025-07-30 ENCOUNTER — MYC MEDICAL ADVICE (OUTPATIENT)
Dept: FAMILY MEDICINE | Facility: CLINIC | Age: 40
End: 2025-07-30
Payer: COMMERCIAL

## 2025-07-30 NOTE — TELEPHONE ENCOUNTER
Retail Pharmacy Prior Authorization Team   Phone: [phone]    PA Initiation    Medication: WEGOVY 0.25 MG/0.5ML SC SOAJ  Insurance Company: [employer] - Phone [phone] Fax [phone]  Pharmacy Filling the Rx: [facility] [place] - [address]  Filling Pharmacy Phone: [phone]  Filling Pharmacy Fax:    Start Date: 7/30/2025    RONN CURIEL (Kwon: [id number])

## 2025-07-30 NOTE — TELEPHONE ENCOUNTER
Routing to PCP with referral beatriz'd up for verification and approval, if appropriate.      Jim Harding Jr., CMA on 7/30/2025 at 12:11 PM

## 2025-07-30 NOTE — TELEPHONE ENCOUNTER
Prior Authorization Approval    3 MONTH APPROVAL TO ALLOW PATIENT TO START A WEIGHT LOSS PROGRAM    Medication: WEGOVY 0.25 MG/0.5ML SC SOAJ  Authorization Effective Date: 6/30/2025  Authorization Expiration Date: 10/30/2025  Insurance Company: [employer] - Phone [phone] Fax [phone]  Which Pharmacy is filling the prescription: [facility] [place], [place] - [address]  Pharmacy Notified: YES  Patient Notified: YES (called pharmacy to notify and notified patient via [employer] message)

## 2025-08-04 ENCOUNTER — PATIENT OUTREACH (OUTPATIENT)
Dept: CARE COORDINATION | Facility: CLINIC | Age: 40
End: 2025-08-04
Payer: COMMERCIAL

## 2025-08-05 ENCOUNTER — VIRTUAL VISIT (OUTPATIENT)
Dept: PHARMACY | Facility: CLINIC | Age: 40
End: 2025-08-05
Attending: STUDENT IN AN ORGANIZED HEALTH CARE EDUCATION/TRAINING PROGRAM
Payer: COMMERCIAL

## 2025-08-05 DIAGNOSIS — Z30.09 GENERAL COUNSELING FOR PRESCRIPTION OF ORAL CONTRACEPTIVES: ICD-10-CM

## 2025-08-05 DIAGNOSIS — Z86.39 HISTORY OF OBESITY: Primary | ICD-10-CM

## 2025-08-05 DIAGNOSIS — T63.441D BEE STING REACTION, ACCIDENTAL OR UNINTENTIONAL, SUBSEQUENT ENCOUNTER: ICD-10-CM

## 2025-08-05 DIAGNOSIS — M25.59 PAIN IN OTHER JOINT: ICD-10-CM

## 2025-08-05 PROCEDURE — 99605 MTMS BY PHARM NP 15 MIN: CPT | Mod: 93 | Performed by: PHARMACIST

## 2025-08-05 PROCEDURE — 99607 MTMS BY PHARM ADDL 15 MIN: CPT | Mod: 93 | Performed by: PHARMACIST

## 2025-08-06 ENCOUNTER — PATIENT OUTREACH (OUTPATIENT)
Dept: CARE COORDINATION | Facility: CLINIC | Age: 40
End: 2025-08-06
Payer: COMMERCIAL

## 2025-08-17 ENCOUNTER — MYC MEDICAL ADVICE (OUTPATIENT)
Dept: FAMILY MEDICINE | Facility: CLINIC | Age: 40
End: 2025-08-17
Payer: COMMERCIAL

## 2025-08-20 ENCOUNTER — VIRTUAL VISIT (OUTPATIENT)
Dept: FAMILY MEDICINE | Facility: CLINIC | Age: 40
End: 2025-08-20
Payer: COMMERCIAL

## 2025-08-20 DIAGNOSIS — Z86.39 HISTORY OF OBESITY: Primary | ICD-10-CM

## 2025-08-20 DIAGNOSIS — E66.3 OVERWEIGHT (BMI 25.0-29.9): ICD-10-CM

## 2025-08-20 PROCEDURE — 98005 SYNCH AUDIO-VIDEO EST LOW 20: CPT | Performed by: FAMILY MEDICINE
